# Patient Record
Sex: FEMALE | Race: BLACK OR AFRICAN AMERICAN | NOT HISPANIC OR LATINO
[De-identification: names, ages, dates, MRNs, and addresses within clinical notes are randomized per-mention and may not be internally consistent; named-entity substitution may affect disease eponyms.]

---

## 2017-07-24 ENCOUNTER — TRANSCRIPTION ENCOUNTER (OUTPATIENT)
Age: 22
End: 2017-07-24

## 2017-08-10 ENCOUNTER — TRANSCRIPTION ENCOUNTER (OUTPATIENT)
Age: 22
End: 2017-08-10

## 2018-02-05 ENCOUNTER — TRANSCRIPTION ENCOUNTER (OUTPATIENT)
Age: 23
End: 2018-02-05

## 2018-05-03 ENCOUNTER — RX RENEWAL (OUTPATIENT)
Age: 23
End: 2018-05-03

## 2018-12-07 ENCOUNTER — TRANSCRIPTION ENCOUNTER (OUTPATIENT)
Age: 23
End: 2018-12-07

## 2019-01-25 ENCOUNTER — TRANSCRIPTION ENCOUNTER (OUTPATIENT)
Age: 24
End: 2019-01-25

## 2019-07-13 ENCOUNTER — INPATIENT (INPATIENT)
Facility: HOSPITAL | Age: 24
LOS: 5 days | Discharge: HOME | End: 2019-07-19
Attending: INTERNAL MEDICINE | Admitting: INTERNAL MEDICINE
Payer: MEDICAID

## 2019-07-13 ENCOUNTER — TRANSCRIPTION ENCOUNTER (OUTPATIENT)
Age: 24
End: 2019-07-13

## 2019-07-13 VITALS
DIASTOLIC BLOOD PRESSURE: 68 MMHG | HEART RATE: 92 BPM | RESPIRATION RATE: 18 BRPM | OXYGEN SATURATION: 100 % | TEMPERATURE: 98 F | SYSTOLIC BLOOD PRESSURE: 121 MMHG

## 2019-07-13 LAB
ACANTHOCYTES BLD QL SMEAR: SLIGHT — SIGNIFICANT CHANGE UP
ALBUMIN SERPL ELPH-MCNC: 4.7 G/DL — SIGNIFICANT CHANGE UP (ref 3.5–5.2)
ALP SERPL-CCNC: 56 U/L — SIGNIFICANT CHANGE UP (ref 30–115)
ALT FLD-CCNC: 9 U/L — SIGNIFICANT CHANGE UP (ref 0–41)
ANION GAP SERPL CALC-SCNC: 16 MMOL/L — HIGH (ref 7–14)
AST SERPL-CCNC: 25 U/L — SIGNIFICANT CHANGE UP (ref 0–41)
BASOPHILS # BLD AUTO: 0 K/UL — SIGNIFICANT CHANGE UP (ref 0–0.2)
BASOPHILS NFR BLD AUTO: 0 % — SIGNIFICANT CHANGE UP (ref 0–1)
BILIRUB SERPL-MCNC: 0.3 MG/DL — SIGNIFICANT CHANGE UP (ref 0.2–1.2)
BUN SERPL-MCNC: 8 MG/DL — LOW (ref 10–20)
CALCIUM SERPL-MCNC: 9.4 MG/DL — SIGNIFICANT CHANGE UP (ref 8.5–10.1)
CHLORIDE SERPL-SCNC: 101 MMOL/L — SIGNIFICANT CHANGE UP (ref 98–110)
CO2 SERPL-SCNC: 22 MMOL/L — SIGNIFICANT CHANGE UP (ref 17–32)
CREAT SERPL-MCNC: 0.6 MG/DL — LOW (ref 0.7–1.5)
EOSINOPHIL # BLD AUTO: 0.11 K/UL — SIGNIFICANT CHANGE UP (ref 0–0.7)
EOSINOPHIL NFR BLD AUTO: 3.7 % — SIGNIFICANT CHANGE UP (ref 0–8)
GIANT PLATELETS BLD QL SMEAR: PRESENT — SIGNIFICANT CHANGE UP
GLUCOSE SERPL-MCNC: 91 MG/DL — SIGNIFICANT CHANGE UP (ref 70–99)
HCG SERPL QL: NEGATIVE — SIGNIFICANT CHANGE UP
HCT VFR BLD CALC: 30.7 % — LOW (ref 37–47)
HGB BLD-MCNC: 9.9 G/DL — LOW (ref 12–16)
LYMPHOCYTES # BLD AUTO: 0.5 K/UL — LOW (ref 1.2–3.4)
LYMPHOCYTES # BLD AUTO: 16.5 % — LOW (ref 20.5–51.1)
MANUAL SMEAR VERIFICATION: SIGNIFICANT CHANGE UP
MCHC RBC-ENTMCNC: 27.3 PG — SIGNIFICANT CHANGE UP (ref 27–31)
MCHC RBC-ENTMCNC: 32.2 G/DL — SIGNIFICANT CHANGE UP (ref 32–37)
MCV RBC AUTO: 84.8 FL — SIGNIFICANT CHANGE UP (ref 81–99)
MONOCYTES # BLD AUTO: 0.11 K/UL — SIGNIFICANT CHANGE UP (ref 0.1–0.6)
MONOCYTES NFR BLD AUTO: 3.7 % — SIGNIFICANT CHANGE UP (ref 1.7–9.3)
MYELOCYTES NFR BLD: 0.9 % — HIGH (ref 0–0)
NEUTROPHILS # BLD AUTO: 2.18 K/UL — SIGNIFICANT CHANGE UP (ref 1.4–6.5)
NEUTROPHILS NFR BLD AUTO: 69.7 % — SIGNIFICANT CHANGE UP (ref 42.2–75.2)
NEUTS BAND # BLD: 2.7 % — SIGNIFICANT CHANGE UP (ref 0–6)
OVALOCYTES BLD QL SMEAR: SLIGHT — SIGNIFICANT CHANGE UP
PLAT MORPH BLD: NORMAL — SIGNIFICANT CHANGE UP
PLATELET # BLD AUTO: 94 K/UL — LOW (ref 130–400)
POIKILOCYTOSIS BLD QL AUTO: SIGNIFICANT CHANGE UP
POTASSIUM SERPL-MCNC: 3.6 MMOL/L — SIGNIFICANT CHANGE UP (ref 3.5–5)
POTASSIUM SERPL-SCNC: 3.6 MMOL/L — SIGNIFICANT CHANGE UP (ref 3.5–5)
PROT SERPL-MCNC: 9 G/DL — HIGH (ref 6–8)
RBC # BLD: 3.62 M/UL — LOW (ref 4.2–5.4)
RBC # FLD: 12.6 % — SIGNIFICANT CHANGE UP (ref 11.5–14.5)
RBC BLD AUTO: ABNORMAL
SMUDGE CELLS # BLD: PRESENT — SIGNIFICANT CHANGE UP
SODIUM SERPL-SCNC: 139 MMOL/L — SIGNIFICANT CHANGE UP (ref 135–146)
TOXIC GRANULES BLD QL SMEAR: PRESENT — SIGNIFICANT CHANGE UP
VARIANT LYMPHS # BLD: 2.8 % — SIGNIFICANT CHANGE UP (ref 0–5)
WBC # BLD: 3.01 K/UL — LOW (ref 4.8–10.8)
WBC # FLD AUTO: 3.01 K/UL — LOW (ref 4.8–10.8)

## 2019-07-13 PROCEDURE — 99285 EMERGENCY DEPT VISIT HI MDM: CPT

## 2019-07-13 PROCEDURE — 70450 CT HEAD/BRAIN W/O DYE: CPT | Mod: 26

## 2019-07-13 NOTE — ED PROVIDER NOTE - PROGRESS NOTE DETAILS
Patient seen and evaluated by me. Labs/imaging ordered. Will re-eval pending work up. Labs/imaging back and results discussed with patient. Spoke with neuro who will come see patient. Called neuro again - has not yet seen patient. She is coming down soon. Case endorsed to Dr. Jeffries pending neuro recs, re-eval, dispo. Spoke with Dr. Shea Hurtado.  Asks for Toxo IGG, Cryptococcal Serum Antigen and CD4 viral load to be added on to AM labs.  Endorsed to MAR.

## 2019-07-13 NOTE — ED PROVIDER NOTE - OBJECTIVE STATEMENT
23 year old female, pmhx HIV (unknown last CD4 count but states she thinks it was in the 30s? - last tested March 2019 per patient, but most recent record in [facility] shows 215 in March 2016), bipolar disorder, presenting with a 2 day history of right facial weakness and RUE and RLE weakness. 23 year old female, pmhx HIV (unknown last CD4 count but states she thinks it was in the 30s? - last tested March 2019 per patient, but most recent record in [facility] shows 215 in March 2016), bipolar disorder, presenting with a 2 day history of right facial weakness and RUE and RLE weakness. States she thought at first she had slept in a weird position causing the symptoms but the symptoms didn't go away so she came to the ED. Denies having this before, and denies other symptoms including fevers, headache, vision changes, numbness, confusion, URI symptoms, neck pain, chest pain, back pain, dyspnea, cough, palpitations, nausea, vomiting, abdominal pain, diarrhea, constipation, blood in stool/dark stools, urinary symptoms, vaginal bleeding/discharge, leg swelling, rash, recent travel or sick contacts.

## 2019-07-13 NOTE — CONSULT NOTE ADULT - ASSESSMENT
24 yo right handed female with pmhx HIV (unknown last CD4 count but states she thinks it was in the 30s? - last tested March 2019 per patient, but most recent record in [facility] shows 215 in March 2016), bipolar disorder, presenting with a 2 day history of right  RUE and RLE weakness. NIHSS 3. CTH negative for acute findings.                                  R/o CVA      Plan  Admit to stroke unit  N/c mri brain  MRA H/N  start asa 81 mg q 24  start Lipitor 40mg q 24  Echo with bubble  Lipid profile , hbaic  pt /rehab  Speech and swallow  Neuro attending note will follow

## 2019-07-13 NOTE — ED PROVIDER NOTE - PHYSICAL EXAMINATION
Vital Signs: I have reviewed the initial vital signs.  Constitutional: NAD, well-nourished, appears stated age, no acute distress.  HEENT: Airway patent, moist MM, no erythema/swelling/deformity of oral structures. EOMI, PERRLA.  CV: regular rate, regular rhythm, well-perfused extremities, 2+ b/l DP and radial pulses equal.  Lungs: BCTA, no increased WOB.  ABD: NTND, no guarding or rebound, no pulsatile mass, no hernias.   MSK: Neck supple, nontender, nl ROM, no stepoff. Chest nontender. Back nontender in TLS spine or to b/l bony structures or flanks. Ext nontender, nl rom, no deformity.   INTEG: Skin warm, dry, no rash.  NEURO: A&Ox3, 4/5 strength in RUE and RLE, 5/5 strength in LUE and LLE. (+) slight facial droop on the right. nl sensation throughout, normal speech.   PSYCH: Calm, cooperative, normal affect and interaction.

## 2019-07-13 NOTE — CONSULT NOTE ADULT - SUBJECTIVE AND OBJECTIVE BOX
CC: Right sided weakness        HPI:   24 yo right handed female with pmhx HIV (unknown last CD4 count but states she thinks it was in the 30s? - last tested March 2019 per patient, but most recent record in [facility] shows 215 in March 2016), bipolar disorder, presenting with a 2 day history of right  RUE and RLE weakness. Patient says that she first noted the weakness in her right arm due to which she had difficulty brushing her teeth and she initially thought that she slept the wrong way causing the symptoms but the next day she experienced heaviness of her right leg due to which she had difficulty walking. Since the symptoms persisted she came to ED for further eval.  Denies similar symptoms before and denies other symptoms including fevers, headache, vision changes, speech deficits numbness, paresthesias, confusion, neck pain.     Home Medications:  Prescobix  Tivicay  Dapsone  Descovy  Lamotrigine 25 mg q 24    Social history  Denies smoking alcohol or drug use    Family history  Mother with HIV and CVA X 2 at age 51 which left her paralysed  Father with Afib and HTN    Other pertinent hx  Patient has  Nexplanon implant  She denies h/o blood clots    Neuro Exam:  Orientation: oriented to person, place time and situation, speech and language intact  Cranial Nerves: visual acuity intact bilaterally, visual fields full to confrontation, pupils equal round and reactive to light, extraocular motion intact, facial sensation intact symmetrically, face symmetrical, hearing was intact bilaterally, tongue and palate midline, head turning and shoulder shrug symmetric and there was no tongue deviation with protrusion.   Motor: RUE 4+/5   RLE 4-/5             LUE  5/5     LLE 5/5   Sensory exam:  Intact  Coordination:. dysmetria on the right , limb ataxia on the right  DTR: Brisk reflexes throughout  gait: deferred    NIHSS:   Loc  0   commands  0    questions   0  vf 0  gaze  0  Face 0  RUE 1  RLE  1  LUE 0  LLE0  Sensory  0  Allergies    No Known Allergies    Intolerances      MEDICATIONS  (STANDING):    MEDICATIONS  (PRN):      LABS:                        9.9    3.01  )-----------( 94       ( 13 Jul 2019 19:00 )             30.7     07-13    139  |  101  |  8<L>  ----------------------------<  91  3.6   |  22  |  0.6<L>    Ca    9.4      13 Jul 2019 19:00    TPro  9.0<H>  /  Alb  4.7  /  TBili  0.3  /  DBili  x   /  AST  25  /  ALT  9   /  AlkPhos  56  07-13            Neuro Imaging:    EEG:     Echo:   Carotid Doppler: N/A  Telemetry: CC: Right sided weakness        HPI:   22 yo right handed female with pmhx HIV (unknown last CD4 count but states she thinks it was in the 30s? - last tested March 2019 per patient, but most recent record in [facility] shows 215 in March 2016), bipolar disorder, presenting with a 2 day history of right  RUE and RLE weakness. Patient says that she first noted the weakness in her right arm due to which she had difficulty brushing her teeth and she initially thought that she slept the wrong way causing the symptoms but the next day she experienced heaviness of her right leg due to which she had difficulty walking. Since the symptoms persisted she came to ED for further eval.  Denies similar symptoms before and denies other symptoms including fevers, headache, vision changes, speech deficits numbness, paresthesias, confusion, neck pain.     Home Medications:  Prescobix  Tivicay  Dapsone  Descovy  Lamotrigine 25 mg q 24    Social history  Denies smoking alcohol or drug use    Family history  Mother with HIV and CVA X 2 at age 51 which left her paralysed  Father with Afib and HTN    Other pertinent hx  Patient has  Nexplanon implant  She denies h/o blood clots    Neuro Exam:  Orientation: oriented to person, place time and situation, speech and language intact  Cranial Nerves: visual acuity intact bilaterally, visual fields full to confrontation, pupils equal round and reactive to light, extraocular motion intact, facial sensation intact symmetrically, face symmetrical, hearing was intact bilaterally, tongue and palate midline, head turning and shoulder shrug symmetric and there was no tongue deviation with protrusion.   Motor: RUE 4+/5   RLE 4-/5             LUE  5/5     LLE 5/5   Sensory exam:  Intact  Coordination:. dysmetria on the right , limb ataxia on the right  DTR: Brisk reflexes throughout  gait: deferred    NIHSS: 3   Loc  0   commands  0    questions   0  vf 0  gaze  0  Face 0  ataxia 1  RUE 1  RLE  1  LUE 0  LLE0  Sensory  0  speech 0  language 0  extinction 0    mRS Baseline 0  mRs current 2      Allergies    No Known Allergies    Intolerances      MEDICATIONS  (STANDING):    MEDICATIONS  (PRN):      LABS:                        9.9    3.01  )-----------( 94       ( 13 Jul 2019 19:00 )             30.7     07-13    139  |  101  |  8<L>  ----------------------------<  91  3.6   |  22  |  0.6<L>    Ca    9.4      13 Jul 2019 19:00    TPro  9.0<H>  /  Alb  4.7  /  TBili  0.3  /  DBili  x   /  AST  25  /  ALT  9   /  AlkPhos  56  07-13            Neuro Imaging:  < from: CT Head No Cont (07.13.19 @ 20:01) >  Findings:    The ventricles, basal cisterns and sulcal pattern are within normal   limits for the patient's stated age.      Grey-white differentiation is preserved.    There is no acute mass effect, midline shift or intracranial hemorrhage.      The calvarium is intact.    The visualized paranasal sinuses and bilateral mastoid complexes are   unremarkable. The globes and orbits are unremarkable.    Beam hardening artifact is noted overlying the brain stem and posterior  fossa which is inherent to CT in this location.    Impression:     No CT evidence of acute intracranial pathology.      < end of copied text >    EEG:     Echo:   Carotid Doppler: N/A  Telemetry:

## 2019-07-13 NOTE — H&P ADULT - NSHPREVIEWOFSYSTEMS_GEN_ALL_CORE
CONSTITUTIONAL: No fevers or chills  EYES/ENT: No visual changes;  No vertigo or throat pain   NECK: No pain or stiffness  RESPIRATORY: No cough, wheezing, hemoptysis; No shortness of breath  CARDIOVASCULAR: No chest pain or palpitations  GASTROINTESTINAL: No abdominal or epigastric pain. No nausea, vomiting, or hematemesis; No diarrhea or constipation. No melena or hematochezia.  GENITOURINARY: No dysuria, frequency or hematuria  NEUROLOGICAL: see HPI  SKIN: No itching, rashes

## 2019-07-13 NOTE — H&P ADULT - NSICDXPASTMEDICALHX_GEN_ALL_CORE_FT
PAST MEDICAL HISTORY:  Anxiety     Bipolar disorder     HIV (human immunodeficiency virus infection)

## 2019-07-13 NOTE — H&P ADULT - HISTORY OF PRESENT ILLNESS
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 she states that her right upper ext doesn't feel right, she can hold things without falling but she feels weak, even during brushing her teeth she cannot do it as usual, she feels like the weakness more exacerbated at the shoulder joint. her right leg is also weak, she has a limp to the right, mainly at the shoulder but no fall. she denies slurred speech, H/A, blurry vision, diplopia, fever, chills, sick contacts, recent travel, pets at home or contacts with animals.  she is following now with dr Garcia and last CD4 count was in march, as per the patient it was low 37, and dr Garcia did not get back to her what to do.    in the ED IJ=652/68   P=92  P=100  RR=18  T=98.5  labs showed pancytopenia, CTH prelim is negative

## 2019-07-13 NOTE — H&P ADULT - NSHPPHYSICALEXAM_GEN_ALL_CORE
Vital Signs Last 24 Hrs  T(C): 36.9 (13 Jul 2019 16:23), Max: 36.9 (13 Jul 2019 16:23)  T(F): 98.5 (13 Jul 2019 16:23), Max: 98.5 (13 Jul 2019 16:23)  HR: 92 (13 Jul 2019 16:23) (92 - 92)  BP: 121/68 (13 Jul 2019 16:23) (121/68 - 121/68)  RR: 18 (13 Jul 2019 16:23) (18 - 18)  SpO2: 100% (13 Jul 2019 16:23) (100% - 100%)    GENERAL: NAD, speaks in full sentences, no signs of respiratory distress  HEAD:  Atraumatic, Normocephalic  EYES: EOMI, PERRLA   NECK: Supple, No JVD  CHEST/LUNG: Clear to auscultation bilaterally; No wheeze; No crackles; No accessory muscles used  HEART: Regular rate and rhythm; No murmurs;   ABDOMEN: Soft, Nontender, Nondistended; Bowel sounds present; No guarding  EXTREMITIES:  2+ Peripheral Pulses, No cyanosis or edema  PSYCH: AAOx3  NEUROLOGY: motor power 5/5 all over but 3-4/5 in right shoulder and 4/5 right hip, ataxic gait with limping to right, right drift  SKIN: No rashes or lesions

## 2019-07-13 NOTE — ED PROVIDER NOTE - CARE PLAN
Principal Discharge DX:	Weakness of right upper extremity  Secondary Diagnosis:	Weakness on right side of face  Secondary Diagnosis:	Weakness of right lower extremity  Secondary Diagnosis:	HIV (human immunodeficiency virus infection)

## 2019-07-13 NOTE — ED PROVIDER NOTE - SECONDARY DIAGNOSIS.
Weakness on right side of face Weakness of right lower extremity HIV (human immunodeficiency virus infection)

## 2019-07-13 NOTE — ED ADULT NURSE NOTE - CHPI ED NUR SYMPTOMS NEG
no change in level of consciousness/no dizziness/no numbness/no confusion/no loss of consciousness/no blurred vision/no vomiting/no nausea/no fever

## 2019-07-13 NOTE — H&P ADULT - NSHPLABSRESULTS_GEN_ALL_CORE
Labs:                        9.9    3.01  )-----------( 94       ( 13 Jul 2019 19:00 )             30.7             07-13    139  |  101  |  8<L>  ----------------------------<  91  3.6   |  22  |  0.6<L>    Ca    9.4      13 Jul 2019 19:00    TPro  9.0<H>  /  Alb  4.7  /  TBili  0.3  /  DBili  x   /  AST  25  /  ALT  9   /  AlkPhos  56  07-13    LIVER FUNCTIONS - ( 13 Jul 2019 19:00 )  Alb: 4.7 g/dL / Pro: 9.0 g/dL / ALK PHOS: 56 U/L / ALT: 9 U/L / AST: 25 U/L / GGT: x                     Imaging:  < from: CT Head No Cont (07.13.19 @ 20:01) >  Impression:   No CT evidence of acute intracranial pathology.  ******PRELIMINARY REPORT******    ******PRELIMINARY REPORT******        < end of copied text >

## 2019-07-13 NOTE — CONSULT NOTE ADULT - ATTENDING COMMENTS
patient seen and examined  agree with NP note except as noted  NIHSS 3  MRS0  PATIENT WITH PERSISTENT R HP AS ABOVE  PLAN AS ABOVE  IF MRI BRAIN AND MRA H/N UNREMARKABLE, THEN CONSIDER MRI C SPINE (NO FACIAL INVOLVEMENT)  PLEASE CHECK HYPERCOAG PANEL AS WELL  CONSIDER ID eval given hx of HIV and reported low CD4

## 2019-07-13 NOTE — ED ADULT NURSE NOTE - OBJECTIVE STATEMENT
Patient presents with c/o right sided weakness for about 2 days. States her right leg and arm feel heavy and not as strong. Denies any LOC. No chest pain or shortness of breath.

## 2019-07-13 NOTE — H&P ADULT - ASSESSMENT
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     *New onset weakness in HIV patient  -CTH negative  -leukopenia  -r/o lymphoma, HIV encephalopathy, toxoplasma, cryptococcus  -MR brain with and without contrast, if MRI is negative consider LP  -Toxo serology, cryptococcal ag  -HIV viral load with T cell subset  -ID follow up  -Neurology evaluation    *Pancytopenia   -can be HIV related, medications related (lamotrigine)  -will check folate and b12, iron studies    *HIV  -check count  -c/w home meds, follow up ID  -she is on dapsone for ppx   -according to CD4 count start ppx as needed    *Anxiety, Bipolar  -c/w lamotrigine and hydroxyzine     *DVT ppx: SCD until MRI is back  *regular diet, allergy to mushroom  *Full code

## 2019-07-13 NOTE — H&P ADULT - NSICDXFAMILYHX_GEN_ALL_CORE_FT
FAMILY HISTORY:  Family history of HIV, mother  FH: atrial fibrillation, father  FH: HTN (hypertension), father

## 2019-07-14 LAB
IRON SATN MFR SERPL: 31 % — SIGNIFICANT CHANGE UP (ref 15–50)
IRON SATN MFR SERPL: 94 UG/DL — SIGNIFICANT CHANGE UP (ref 35–150)
RBC # BLD: 3.43 M/UL — LOW (ref 4.2–5.4)
RETICS #: 23.3 K/UL — LOW (ref 25–125)
RETICS/RBC NFR: 0.7 % — SIGNIFICANT CHANGE UP (ref 0.5–1.5)
TIBC SERPL-MCNC: 306 UG/DL — SIGNIFICANT CHANGE UP (ref 220–430)
UIBC SERPL-MCNC: 212 UG/DL — SIGNIFICANT CHANGE UP (ref 110–370)

## 2019-07-14 PROCEDURE — 70544 MR ANGIOGRAPHY HEAD W/O DYE: CPT | Mod: 26,59

## 2019-07-14 PROCEDURE — 70553 MRI BRAIN STEM W/O & W/DYE: CPT | Mod: 26

## 2019-07-14 PROCEDURE — 99222 1ST HOSP IP/OBS MODERATE 55: CPT

## 2019-07-14 RX ORDER — DARUNAVIR ETHANOLATE AND COBICISTAT 800; 150 MG/1; MG/1
1 TABLET, FILM COATED ORAL DAILY
Refills: 0 | Status: DISCONTINUED | OUTPATIENT
Start: 2019-07-14 | End: 2019-07-18

## 2019-07-14 RX ORDER — HYDROXYZINE HCL 10 MG
50 TABLET ORAL EVERY 12 HOURS
Refills: 0 | Status: DISCONTINUED | OUTPATIENT
Start: 2019-07-14 | End: 2019-07-19

## 2019-07-14 RX ORDER — CHLORHEXIDINE GLUCONATE 213 G/1000ML
1 SOLUTION TOPICAL
Refills: 0 | Status: DISCONTINUED | OUTPATIENT
Start: 2019-07-14 | End: 2019-07-19

## 2019-07-14 RX ORDER — DAPSONE 100 MG/1
100 TABLET ORAL DAILY
Refills: 0 | Status: DISCONTINUED | OUTPATIENT
Start: 2019-07-14 | End: 2019-07-19

## 2019-07-14 RX ORDER — EMTRICITABINE AND TENOFOVIR DISOPROXIL FUMARATE 200; 300 MG/1; MG/1
1 TABLET, FILM COATED ORAL DAILY
Refills: 0 | Status: DISCONTINUED | OUTPATIENT
Start: 2019-07-14 | End: 2019-07-18

## 2019-07-14 RX ORDER — DOLUTEGRAVIR SODIUM 25 MG/1
50 TABLET, FILM COATED ORAL DAILY
Refills: 0 | Status: DISCONTINUED | OUTPATIENT
Start: 2019-07-14 | End: 2019-07-18

## 2019-07-14 RX ORDER — LAMOTRIGINE 25 MG/1
25 TABLET, ORALLY DISINTEGRATING ORAL AT BEDTIME
Refills: 0 | Status: DISCONTINUED | OUTPATIENT
Start: 2019-07-14 | End: 2019-07-19

## 2019-07-14 RX ADMIN — Medication 50 MILLIGRAM(S): at 17:35

## 2019-07-14 RX ADMIN — LAMOTRIGINE 25 MILLIGRAM(S): 25 TABLET, ORALLY DISINTEGRATING ORAL at 22:21

## 2019-07-14 RX ADMIN — Medication 1 DROP(S): at 20:36

## 2019-07-14 RX ADMIN — Medication 50 MILLIGRAM(S): at 06:43

## 2019-07-14 RX ADMIN — DARUNAVIR ETHANOLATE AND COBICISTAT 1 TABLET(S): 800; 150 TABLET, FILM COATED ORAL at 15:05

## 2019-07-14 RX ADMIN — DOLUTEGRAVIR SODIUM 50 MILLIGRAM(S): 25 TABLET, FILM COATED ORAL at 15:04

## 2019-07-14 RX ADMIN — CHLORHEXIDINE GLUCONATE 1 APPLICATION(S): 213 SOLUTION TOPICAL at 05:25

## 2019-07-14 RX ADMIN — DAPSONE 100 MILLIGRAM(S): 100 TABLET ORAL at 15:04

## 2019-07-14 RX ADMIN — EMTRICITABINE AND TENOFOVIR DISOPROXIL FUMARATE 1 TABLET(S): 200; 300 TABLET, FILM COATED ORAL at 15:06

## 2019-07-14 NOTE — PROGRESS NOTE ADULT - ASSESSMENT
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     *New onset weakness in HIV patient  -CTH negative for bleed or mass  -r/o lymphoma, HIV encephalopathy, toxoplasma, cryptococcus  - f/u MRI brain  -Toxo serology, cryptococcal ag f/u, specimen received  -HIV viral load with T cell subset ordered  -ID follow up  -Neurology f/u    *Pancytopenia   -can be HIV related, medications related (lamotrigine)  - ordered cd4 and viral load    *HIV  -c/w home meds, follow up ID  -she is on dapsone for ppx     *Anxiety, Bipolar  -c/w lamotrigine and hydroxyzine     *DVT ppx: SCD until MRI  *regular diet,  *Full code

## 2019-07-14 NOTE — PROGRESS NOTE ADULT - SUBJECTIVE AND OBJECTIVE BOX
SUBJECTIVE:    Patient is a 23y old Female who presents with a chief complaint of Righty sided weakness (14 Jul 2019 09:39)    Currently admitted to medicine with the primary diagnosis of Weakness of right upper extremity     Today is hospital day 1d. This morning she is resting comfortably in bed and reports no new issues or overnight events.     PAST MEDICAL & SURGICAL HISTORY  Anxiety  Bipolar disorder  HIV (human immunodeficiency virus infection)  No significant past surgical history       ALLERGIES:  Mushrooms (Pruritus)  No Known Drug Allergies    MEDICATIONS:  STANDING MEDICATIONS  artificial tears (preservative free) Ophthalmic Solution 1 Drop(s) Both EYES two times a day  chlorhexidine 4% Liquid 1 Application(s) Topical <User Schedule>  dapsone 100 milliGRAM(s) Oral daily  darunavir 800 mG/cobicstat 150 mG 1 Tablet(s) Oral daily  dolutegravir 50 milliGRAM(s) Oral daily  emtricitabine 200 mG/tenofovir alafenamide 25 mG (DESCOVY) Tablet 1 Tablet(s) Oral daily  hydrOXYzine hydrochloride 50 milliGRAM(s) Oral every 12 hours  lamoTRIgine 25 milliGRAM(s) Oral at bedtime    PRN MEDICATIONS    VITALS:   T(F): 99.1  HR: 82  BP: 100/59  RR: 18  SpO2: 100%    LABS:                        9.9    3.01  )-----------( 94       ( 13 Jul 2019 19:00 )             30.7     07-13    139  |  101  |  8<L>  ----------------------------<  91  3.6   |  22  |  0.6<L>    Ca    9.4      13 Jul 2019 19:00    TPro  9.0<H>  /  Alb  4.7  /  TBili  0.3  /  DBili  x   /  AST  25  /  ALT  9   /  AlkPhos  56  07-13      PHYSICAL EXAM:  GEN: No acute distress  LUNGS: Clear to auscultation bilaterally   HEART: Regular  ABD: Soft, non-tender, non-distended.  EXT: mild weakness on rt side of body  NEURO: AAOX3

## 2019-07-14 NOTE — PROGRESS NOTE ADULT - ASSESSMENT
ASSESSMENT  22 yo F with vertical transmission AIDS, Bipolar disorder, anxiety, recent miscarriage, presenting with 2 days of sudden R-sided weakness    IMPRESSION/RECOMMENDATIONS  #Acute R-sided weakness: CTH negative for acute pathology. As patient reports last CD4 is <50 the differential is wide including PML, toxo, VZV vasculopathy, vs. non-infectious etiology  - Appreciate Neuro consult  - Brain MRI  - If no lesions on MRI and not limited by PLTs would obtain LP: cell count, protein, glucose, G/S and culture, CSF PCR, Cryptococcal Ag , VDRL, JOCELYNN Virus  - RPR, Cryptococcal Ag serum, Toxo IgG and IgM, CMV IgG    #Pancytopenia: Unknown baseline, possible AIDS-related ITP  - Obtain baseline from PCP  - Peripheral smear  - check iron studies, ferritin  - check retic  - Alk Ph is wnl, thus low suspicion for MAC or bone marrow occupying infection--> send Acid Fast blood culture   - No febrile illness to suggest EBV or CMV  - Adverse rxn with hemolytic anemia and dapsone if G6PD, should not affect platelets    #Symptomatic AIDS  - Check CD4/VL  - Follows with Dr. Garcia  - On Descovy, prezcobix, DTG (discussed changing to another Integrase inhibitor as she is of child bearing age and increased risk of neural tube defects). Can change regimen to Biktarvy/Prezcobix. Will discuss with Dr. Garcia  - PPX Dapsone     #Bipolar  - Lamictal 25mg PO    Spectra [id number]

## 2019-07-14 NOTE — PROGRESS NOTE ADULT - SUBJECTIVE AND OBJECTIVE BOX
JESSICA ALVAREZ  23y, Female  Allergy: Mushrooms (Pruritus)  No Known Drug Allergies      CHIEF COMPLAINT: Righty sided weakness (13 Jul 2019 23:45)      HPI:  24 yo F with vertical transmission AIDS, on prezcobix, DTG, Descovy with dapsone ppx, Bipolar disorder, anxiety, recent miscarriage, presenting with 2 days of sudden R-sided weakness that she noted after waking up. Denies any HA, paresthesias, numbness, fever, chills. Pt denies rhinorrhea, sore throat, cough, SOB, abd pain, diarrhea, n/v, dysuria, rash, arthralgias. No recent travel or exposures. No drug use.     INFECTIOUS DISEASE HISTORY:    PAST MEDICAL & SURGICAL HISTORY:  Anxiety  Bipolar disorder  HIV (human immunodeficiency virus infection)  No significant past surgical history      FAMILY HISTORY  FH: atrial fibrillation  FH: HTN (hypertension)  Family history of HIV      SOCIAL HISTORY    Substance Use ( x ) never used  (  ) IVDU (  ) Other:  Tobacco Usage:  ( x  ) never smoked   (   ) former smoker   (   ) current smoker   Alcohol Usage: (  x ) social  (   ) daily use (   ) denies  Sexual History: sexually active with males      ROS  General: Denies rigors, nightsweats  HEENT: Denies headache, rhinorrhea, sore throat, eye pain  CV: Denies CP, palpitations  PULM: Denies SOB, wheezing  GI: Denies abdominal pain, hematochezia/melena  : Denies dysuria, hematuria  MSK: Denies arthralgias, myalgias  SKIN: Denies rash, lesions  NEURO: as noted above   PSYCH: Denies depression, anxiety    VITALS:  T(F): 99.1, Max: 99.1 (07-14-19 @ 00:29)  HR: 82  BP: 100/59  RR: 18Vital Signs Last 24 Hrs  T(C): 37.3 (14 Jul 2019 00:29), Max: 37.3 (14 Jul 2019 00:29)  T(F): 99.1 (14 Jul 2019 00:29), Max: 99.1 (14 Jul 2019 00:29)  HR: 82 (14 Jul 2019 00:29) (82 - 92)  BP: 100/59 (14 Jul 2019 00:29) (100/59 - 121/68)  BP(mean): --  RR: 18 (14 Jul 2019 00:29) (18 - 18)  SpO2: 100% (14 Jul 2019 00:29) (100% - 100%)    PHYSICAL EXAM:  Gen: NAD, resting in bed  HEENT: Normocephalic, atraumatic, pupils dilated and reactive, equal  Neck: supple, no lymphadenopathy  CV: Regular rate & regular rhythm  Lungs: CTAB  Abdomen: Soft, BS present  Ext: Warm, well perfused  Neuro: RUE and RLE 4/5, hyperreflexia, dysmetria   Skin: no rash, no erythema  Lines: no phlebitis    TESTS & MEASUREMENTS:                        9.9    3.01  )-----------( 94       ( 13 Jul 2019 19:00 )             30.7     07-13    139  |  101  |  8<L>  ----------------------------<  91  3.6   |  22  |  0.6<L>    Ca    9.4      13 Jul 2019 19:00    TPro  9.0<H>  /  Alb  4.7  /  TBili  0.3  /  DBili  x   /  AST  25  /  ALT  9   /  AlkPhos  56  07-13    eGFR if Non African American: 130 mL/min/1.73M2 (07-13-19 @ 19:00)  eGFR if : 151 mL/min/1.73M2 (07-13-19 @ 19:00)    LIVER FUNCTIONS - ( 13 Jul 2019 19:00 )  Alb: 4.7 g/dL / Pro: 9.0 g/dL / ALK PHOS: 56 U/L / ALT: 9 U/L / AST: 25 U/L / GGT: x                     INFECTIOUS DISEASES TESTING      RADIOLOGY & ADDITIONAL TESTS:  I have personally reviewed the last Chest xray  CXR      CT      CARDIOLOGY TESTING      MEDICATIONS  chlorhexidine 4% Liquid 1  dapsone 100  darunavir 800 mG/cobicstat 150 mG 1  dolutegravir 50  emtricitabine 200 mG/tenofovir alafenamide 25 mG (DESCOVY) Tablet 1  hydrOXYzine hydrochloride 50  lamoTRIgine 25      ANTIBIOTICS:  dapsone 100 milliGRAM(s) Oral daily  darunavir 800 mG/cobicstat 150 mG 1 Tablet(s) Oral daily  dolutegravir 50 milliGRAM(s) Oral daily  emtricitabine 200 mG/tenofovir alafenamide 25 mG (DESCOVY) Tablet 1 Tablet(s) Oral daily      Mushrooms (Pruritus)  No Known Drug Allergies

## 2019-07-15 LAB
4/8 RATIO: 0.1 RATIO — LOW (ref 1.2–3.4)
ABS CD8: 251 /UL — SIGNIFICANT CHANGE UP (ref 206–494)
ALBUMIN SERPL ELPH-MCNC: 4 G/DL — SIGNIFICANT CHANGE UP (ref 3.5–5.2)
ALP SERPL-CCNC: 51 U/L — SIGNIFICANT CHANGE UP (ref 30–115)
ALT FLD-CCNC: 8 U/L — SIGNIFICANT CHANGE UP (ref 0–41)
ANION GAP SERPL CALC-SCNC: 13 MMOL/L — SIGNIFICANT CHANGE UP (ref 7–14)
AST SERPL-CCNC: 23 U/L — SIGNIFICANT CHANGE UP (ref 0–41)
BASOPHILS # BLD AUTO: 0 K/UL — SIGNIFICANT CHANGE UP (ref 0–0.2)
BASOPHILS NFR BLD AUTO: 0 % — SIGNIFICANT CHANGE UP (ref 0–1)
BILIRUB SERPL-MCNC: 0.2 MG/DL — SIGNIFICANT CHANGE UP (ref 0.2–1.2)
BUN SERPL-MCNC: 9 MG/DL — LOW (ref 10–20)
CALCIUM SERPL-MCNC: 9.3 MG/DL — SIGNIFICANT CHANGE UP (ref 8.5–10.1)
CD16+CD56+ CELLS NFR BLD: 20 % — SIGNIFICANT CHANGE UP (ref 4–20)
CD16+CD56+ CELLS NFR SPEC: 86 /UL — LOW (ref 89–385)
CD19 BLASTS SPEC-ACNC: 13 % — SIGNIFICANT CHANGE UP (ref 10–28)
CD19 BLASTS SPEC-ACNC: 58 /UL — LOW (ref 72–502)
CD3 BLASTS SPEC-ACNC: 286 /UL — LOW (ref 800–2012)
CD3 BLASTS SPEC-ACNC: 65 % — SIGNIFICANT CHANGE UP (ref 59–81)
CD4 %: 4 % — LOW (ref 42–58)
CD8 %: 58 % — HIGH (ref 14–30)
CHLORIDE SERPL-SCNC: 104 MMOL/L — SIGNIFICANT CHANGE UP (ref 98–110)
CO2 SERPL-SCNC: 23 MMOL/L — SIGNIFICANT CHANGE UP (ref 17–32)
CREAT SERPL-MCNC: 0.8 MG/DL — SIGNIFICANT CHANGE UP (ref 0.7–1.5)
EOSINOPHIL # BLD AUTO: 0.09 K/UL — SIGNIFICANT CHANGE UP (ref 0–0.7)
EOSINOPHIL NFR BLD AUTO: 4.3 % — SIGNIFICANT CHANGE UP (ref 0–8)
FERRITIN SERPL-MCNC: 30 NG/ML — SIGNIFICANT CHANGE UP (ref 15–150)
FOLATE SERPL-MCNC: 11.2 NG/ML — SIGNIFICANT CHANGE UP
GLUCOSE SERPL-MCNC: 97 MG/DL — SIGNIFICANT CHANGE UP (ref 70–99)
HCT VFR BLD CALC: 30.1 % — LOW (ref 37–47)
HGB BLD-MCNC: 9.6 G/DL — LOW (ref 12–16)
IMM GRANULOCYTES NFR BLD AUTO: 0 % — LOW (ref 0.1–0.3)
LYMPHOCYTES # BLD AUTO: 0.51 K/UL — LOW (ref 1.2–3.4)
LYMPHOCYTES # BLD AUTO: 24.5 % — SIGNIFICANT CHANGE UP (ref 20.5–51.1)
MCHC RBC-ENTMCNC: 27.1 PG — SIGNIFICANT CHANGE UP (ref 27–31)
MCHC RBC-ENTMCNC: 31.9 G/DL — LOW (ref 32–37)
MCV RBC AUTO: 85 FL — SIGNIFICANT CHANGE UP (ref 81–99)
MONOCYTES # BLD AUTO: 0.43 K/UL — SIGNIFICANT CHANGE UP (ref 0.1–0.6)
MONOCYTES NFR BLD AUTO: 20.7 % — HIGH (ref 1.7–9.3)
NEUTROPHILS # BLD AUTO: 1.05 K/UL — LOW (ref 1.4–6.5)
NEUTROPHILS NFR BLD AUTO: 50.5 % — SIGNIFICANT CHANGE UP (ref 42.2–75.2)
NRBC # BLD: 0 /100 WBCS — SIGNIFICANT CHANGE UP (ref 0–0)
PLATELET # BLD AUTO: 88 K/UL — LOW (ref 130–400)
POTASSIUM SERPL-MCNC: 4 MMOL/L — SIGNIFICANT CHANGE UP (ref 3.5–5)
POTASSIUM SERPL-SCNC: 4 MMOL/L — SIGNIFICANT CHANGE UP (ref 3.5–5)
PROT SERPL-MCNC: 8.1 G/DL — HIGH (ref 6–8)
RBC # BLD: 3.54 M/UL — LOW (ref 4.2–5.4)
RBC # FLD: 12.4 % — SIGNIFICANT CHANGE UP (ref 11.5–14.5)
SODIUM SERPL-SCNC: 140 MMOL/L — SIGNIFICANT CHANGE UP (ref 135–146)
T-CELL CD4 SUBSET PNL BLD: 16 /UL — LOW (ref 495–1312)
TSH SERPL-MCNC: 2.01 UIU/ML — SIGNIFICANT CHANGE UP (ref 0.27–4.2)
VIT B12 SERPL-MCNC: 420 PG/ML — SIGNIFICANT CHANGE UP (ref 232–1245)
WBC # BLD: 2.08 K/UL — LOW (ref 4.8–10.8)
WBC # FLD AUTO: 2.08 K/UL — LOW (ref 4.8–10.8)

## 2019-07-15 PROCEDURE — 93306 TTE W/DOPPLER COMPLETE: CPT | Mod: 26

## 2019-07-15 PROCEDURE — 99233 SBSQ HOSP IP/OBS HIGH 50: CPT

## 2019-07-15 RX ORDER — ONDANSETRON 8 MG/1
4 TABLET, FILM COATED ORAL ONCE
Refills: 0 | Status: DISCONTINUED | OUTPATIENT
Start: 2019-07-15 | End: 2019-07-15

## 2019-07-15 RX ORDER — ONDANSETRON 8 MG/1
4 TABLET, FILM COATED ORAL ONCE
Refills: 0 | Status: COMPLETED | OUTPATIENT
Start: 2019-07-15 | End: 2019-07-15

## 2019-07-15 RX ORDER — ACETAMINOPHEN 500 MG
650 TABLET ORAL EVERY 6 HOURS
Refills: 0 | Status: DISCONTINUED | OUTPATIENT
Start: 2019-07-15 | End: 2019-07-19

## 2019-07-15 RX ADMIN — DARUNAVIR ETHANOLATE AND COBICISTAT 1 TABLET(S): 800; 150 TABLET, FILM COATED ORAL at 14:21

## 2019-07-15 RX ADMIN — LAMOTRIGINE 25 MILLIGRAM(S): 25 TABLET, ORALLY DISINTEGRATING ORAL at 21:58

## 2019-07-15 RX ADMIN — DOLUTEGRAVIR SODIUM 50 MILLIGRAM(S): 25 TABLET, FILM COATED ORAL at 14:21

## 2019-07-15 RX ADMIN — Medication 650 MILLIGRAM(S): at 21:58

## 2019-07-15 RX ADMIN — EMTRICITABINE AND TENOFOVIR DISOPROXIL FUMARATE 1 TABLET(S): 200; 300 TABLET, FILM COATED ORAL at 14:51

## 2019-07-15 RX ADMIN — Medication 50 MILLIGRAM(S): at 18:42

## 2019-07-15 RX ADMIN — Medication 650 MILLIGRAM(S): at 22:28

## 2019-07-15 RX ADMIN — ONDANSETRON 4 MILLIGRAM(S): 8 TABLET, FILM COATED ORAL at 12:28

## 2019-07-15 RX ADMIN — Medication 50 MILLIGRAM(S): at 06:18

## 2019-07-15 RX ADMIN — Medication 1 DROP(S): at 18:42

## 2019-07-15 RX ADMIN — DAPSONE 100 MILLIGRAM(S): 100 TABLET ORAL at 14:22

## 2019-07-15 NOTE — PROGRESS NOTE ADULT - SUBJECTIVE AND OBJECTIVE BOX
JESSICA ALVAREZ 23y Female  MRN#: [id number]   CODE STATUS:________      SUBJECTIVE  Patient is a 23y old Female who presents with a chief complaint of Righty sided weakness (15 Jul 2019 09:59)  Currently admitted to medicine with the primary diagnosis of Weakness of right upper extremity  Hospital course has been complicated by _______.   Today is hospital day 2d, and this morning she is _________ and reports ________ overnight events.     Present Today:           Domingo Catheter ()No/ ()Yes? Indication:          Central Line ()No/ ()Yes? Indication:          IV Fluids ()No/ ()Yes? Type:  Rate:  Indication:      OBJECTIVE  PAST MEDICAL & SURGICAL HISTORY  Anxiety  Bipolar disorder  HIV (human immunodeficiency virus infection)  No significant past surgical history    ALLERGIES:  Mushrooms (Pruritus)  No Known Drug Allergies    MEDICATIONS:  STANDING MEDICATIONS  artificial tears (preservative free) Ophthalmic Solution 1 Drop(s) Both EYES two times a day  chlorhexidine 4% Liquid 1 Application(s) Topical <User Schedule>  dapsone 100 milliGRAM(s) Oral daily  darunavir 800 mG/cobicstat 150 mG 1 Tablet(s) Oral daily  dolutegravir 50 milliGRAM(s) Oral daily  emtricitabine 200 mG/tenofovir alafenamide 25 mG (DESCOVY) Tablet 1 Tablet(s) Oral daily  hydrOXYzine hydrochloride 50 milliGRAM(s) Oral every 12 hours  lamoTRIgine 25 milliGRAM(s) Oral at bedtime    PRN MEDICATIONS      VITAL SIGNS: Last 24 Hours  T(C): 36.9 (15 Jul 2019 16:55), Max: 37 (15 Jul 2019 15:37)  T(F): 98.4 (15 Jul 2019 16:55), Max: 98.6 (15 Jul 2019 15:37)  HR: 73 (15 Jul 2019 16:55) (73 - 92)  BP: 97/65 (15 Jul 2019 16:55) (89/52 - 104/62)  BP(mean): --  RR: 18 (15 Jul 2019 16:55) (18 - 18)  SpO2: 99% (15 Jul 2019 15:37) (99% - 99%)    LABS:                        9.6    2.08  )-----------( 88       ( 15 Jul 2019 05:19 )             30.1     07-15    140  |  104  |  9<L>  ----------------------------<  97  4.0   |  23  |  0.8    Ca    9.3      15 Jul 2019 05:19    TPro  8.1<H>  /  Alb  4.0  /  TBili  0.2  /  DBili  x   /  AST  23  /  ALT  8   /  AlkPhos  51  07-15                  RADIOLOGY:      PHYSICAL EXAM:    GENERAL: NAD, well-developed, AAOx3  HEENT:  Atraumatic, Normocephalic. EOMI, PERRLA, conjunctiva and sclera clear, No JVD  PULMONARY: Clear to auscultation bilaterally; No wheeze  CARDIOVASCULAR: Regular rate and rhythm; No murmurs, rubs, or gallops  GASTROINTESTINAL: Soft, Nontender, Nondistended; Bowel sounds present  MUSCULOSKELETAL:  2+ Peripheral Pulses, No clubbing, cyanosis, or edema  NEUROLOGY: non-focal  SKIN: No rashes or lesions      ADMISSION SUMMARY  Patient is a 23y old Female who presents with a chief complaint of Righty sided weakness   Currently admitted to medicine with the primary diagnosis of Weakness of right upper extremity    ASSESSMENT & PLAN JESSICA ALVAREZ 23y Female  MRN#: [id number]     SUBJECTIVE  Patient is a 23y old Female who presents with a chief complaint of Right sided weakness   Currently admitted to medicine with the primary diagnosis of Weakness of right upper extremity  Today is hospital day 2d, and this morning she reports no overnight events.       OBJECTIVE  PAST MEDICAL & SURGICAL HISTORY  Anxiety  Bipolar disorder  HIV (human immunodeficiency virus infection)  No significant past surgical history    ALLERGIES:  Mushrooms (Pruritus)  No Known Drug Allergies    MEDICATIONS:  STANDING MEDICATIONS  artificial tears (preservative free) Ophthalmic Solution 1 Drop(s) Both EYES two times a day  chlorhexidine 4% Liquid 1 Application(s) Topical <User Schedule>  dapsone 100 milliGRAM(s) Oral daily  darunavir 800 mG/cobicstat 150 mG 1 Tablet(s) Oral daily  dolutegravir 50 milliGRAM(s) Oral daily  emtricitabine 200 mG/tenofovir alafenamide 25 mG (DESCOVY) Tablet 1 Tablet(s) Oral daily  hydrOXYzine hydrochloride 50 milliGRAM(s) Oral every 12 hours  lamoTRIgine 25 milliGRAM(s) Oral at bedtime    PRN MEDICATIONS      VITAL SIGNS: Last 24 Hours  T(C): 36.9 (15 Jul 2019 16:55), Max: 37 (15 Jul 2019 15:37)  T(F): 98.4 (15 Jul 2019 16:55), Max: 98.6 (15 Jul 2019 15:37)  HR: 73 (15 Jul 2019 16:55) (73 - 92)  BP: 97/65 (15 Jul 2019 16:55) (89/52 - 104/62)  BP(mean): --  RR: 18 (15 Jul 2019 16:55) (18 - 18)  SpO2: 99% (15 Jul 2019 15:37) (99% - 99%)    LABS:                        9.6    2.08  )-----------( 88       ( 15 Jul 2019 05:19 )             30.1     07-15    140  |  104  |  9<L>  ----------------------------<  97  4.0   |  23  |  0.8    Ca    9.3      15 Jul 2019 05:19    TPro  8.1<H>  /  Alb  4.0  /  TBili  0.2  /  DBili  x   /  AST  23  /  ALT  8   /  AlkPhos  51  07-15      RADIOLOGY:      < from: MR Head w/wo IV Cont (07.14.19 @ 22:16) >  IMPRESSION:     Focal white matter signal abnormality within the left frontal lobe   without contrast enhancement or significant mass effect.  The imaging   features (DWI signal hyperintensity, involvement of subcortical U-fibers)   suggest PML.  Possible minimal involvement of the right frontal lobe.    Case was discussed with Dr. Cedillo 11am 7/15/19     < end of copied text >    < from: MR Angio Head No Cont (07.14.19 @ 21:54) >    IMPRESSION:      Unremarkable MRA of the brain.      < end of copied text >      PHYSICAL EXAM:    GENERAL: NAD, well-developed, AAOx3  HEENT:  Atraumatic, Normocephalic. EOMI, PERRLA, conjunctiva and sclera clear, No JVD  PULMONARY: Clear to auscultation bilaterally; No wheeze  CARDIOVASCULAR: Regular rate and rhythm; No murmurs, rubs, or gallops  GASTROINTESTINAL: Soft, Nontender, Nondistended; Bowel sounds present  MUSCULOSKELETAL:  2+ Peripheral Pulses, No clubbing, cyanosis, or edema  NEUROLOGY: non-focal  SKIN: No rashes or lesions      ADMISSION SUMMARY  Patient is a 23y old Female who presents with a chief complaint of Righty sided weakness   Currently admitted to medicine with the primary diagnosis of Weakness of right upper extremity    ASSESSMENT & PLAN  24 yo F with vertical transmission AIDS, Bipolar disorder, anxiety, recent miscarriage, presenting with 2 days of sudden R-sided weakness    # Progressive multifocal Leucoencephalopathy  -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 Neuro exam showed Power 4/5 in RUE and 3/5 in RLE. No sensory deficits. Severe dysmetria present in RUE  -Patient refused LP to test for JOCELYNN virus. 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 Garcia at [facility]  - On Descovy, prezcobix, Dolutegravir  - Dapsone for prophylaxis    #Bipolar  - Lamictal 25mg PO and hydroxyzine     *DVT ppx: SCD   *regular diet  *Full code

## 2019-07-15 NOTE — PROGRESS NOTE ADULT - ATTENDING COMMENTS
Patient seen and examined and agree with above except as noted.  Patient with right sided weakness that has been present for 3 days.  MRI reviewed and appears to suggest subacute ischemia, PML or other etiology.  Exam shows right Leg>arm weakness symmetric sensation and no other findings.  NIHSS 2  mrankin 0    Plan   1. ID consult for HIV management  2. LP for cell count, TP, Glucose, MS panel, CSF JCV PCR ([facility]), bacterial, viral, fungal cultures  3. Serology for JOCELYNN virus, CD4, Viral load  4. REEG  5. f/u official MRI brain and MRA

## 2019-07-15 NOTE — PROGRESS NOTE ADULT - ASSESSMENT
22 yo F with vertical transmission AIDS, Bipolar disorder, anxiety, recent miscarriage, presenting with 2 days of sudden R-sided weakness    IMPRESSION/RECOMMENDATIONS  #Acute R-sided weakness: CTH negative for acute pathology. As patient reports last CD4 is <50 the differential is wide including PML, toxo, VZV vasculopathy, vs. 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 Garcia at [facility]  - On Descovy, prezcobix, DTG (discussed changing to another Integrase inhibitor as she is of child bearing age and increased risk of neural tube defects). Can change regimen to Biktarvy/Prezcobix. Will discuss with Dr. Garcia  - PPX Dapsone     #Bipolar  - Lamictal 25mg PO

## 2019-07-15 NOTE — PROGRESS NOTE ADULT - SUBJECTIVE AND OBJECTIVE BOX
JESSICA ALVAREZ  23y, Female  Allergy: Mushrooms (Pruritus)  No Known Drug Allergies      CHIEF COMPLAINT: Righty sided weakness (15 Jul 2019 01:07)      INTERVAL EVENTS/HPI  - No acute events overnight  - T(F): , Max: 98.4 (07-14-19 @ 19:45)  - Denies any worsening symptoms  - Tolerating medication  - WBC Count: 2.08 K/uL (07-15-19 @ 05:19)      ROS  General: Denies fevers, chills, nightsweats, weight loss  HEENT: Denies headache, rhinorrhea, sore throat, eye pain  CV: Denies CP, palpitations  PULM: Denies SOB, cough  GI: Denies abdominal pain, diarrhea  : Denies dysuria, hematuria  MSK: Denies arthralgias  SKIN: Denies rash   NEURO: Denies paresthesias, weakness  PSYCH: Denies depression    FH noncontributory     VITALS:  T(F): 98.4, Max: 98.4 (07-14-19 @ 19:45)  HR: 76  BP: 96/58  RR: 18Vital Signs Last 24 Hrs  T(C): 36.9 (15 Jul 2019 08:46), Max: 36.9 (14 Jul 2019 19:45)  T(F): 98.4 (15 Jul 2019 08:46), Max: 98.4 (14 Jul 2019 19:45)  HR: 76 (15 Jul 2019 08:46) (76 - 92)  BP: 96/58 (15 Jul 2019 08:46) (96/52 - 104/60)  BP(mean): --  RR: 18 (15 Jul 2019 08:46) (18 - 18)  SpO2: 99% (15 Jul 2019 08:46) (99% - 99%)    PHYSICAL EXAM:  Gen: NAD, resting in bed  HEENT: Normocephalic, atraumatic  Neck: supple, no lymphadenopathy  CV: Regular rate & regular rhythm  Lungs: decreased BS at bases, no fremitus  Abdomen: Soft, BS present  Ext: Warm, well perfused  Neuro: non focal, awake  Skin: no rash, no erythema      TESTS & MEASUREMENTS:                        9.6    2.08  )-----------( 88       ( 15 Jul 2019 05:19 )             30.1     07-15    140  |  104  |  9<L>  ----------------------------<  97  4.0   |  23  |  0.8    Ca    9.3      15 Jul 2019 05:19    TPro  8.1<H>  /  Alb  4.0  /  TBili  0.2  /  DBili  x   /  AST  23  /  ALT  8   /  AlkPhos  51  07-15    eGFR if Non African American: 105 mL/min/1.73M2 (07-15-19 @ 05:19)  eGFR if African American: 122 mL/min/1.73M2 (07-15-19 @ 05:19)    LIVER FUNCTIONS - ( 15 Jul 2019 05:19 )  Alb: 4.0 g/dL / Pro: 8.1 g/dL / ALK PHOS: 51 U/L / ALT: 8 U/L / AST: 23 U/L / GGT: x                     INFECTIOUS DISEASES TESTING      RADIOLOGY & ADDITIONAL TESTS:  I have personally reviewed the last Chest xray  CXR      CT      CARDIOLOGY TESTING      MEDICATIONS  artificial tears (preservative free) Ophthalmic Solution 1  chlorhexidine 4% Liquid 1  dapsone 100  darunavir 800 mG/cobicstat 150 mG 1  dolutegravir 50  emtricitabine 200 mG/tenofovir alafenamide 25 mG (DESCOVY) Tablet 1  hydrOXYzine hydrochloride 50  lamoTRIgine 25      ANTIBIOTICS:  dapsone 100 milliGRAM(s) Oral daily  darunavir 800 mG/cobicstat 150 mG 1 Tablet(s) Oral daily  dolutegravir 50 milliGRAM(s) Oral daily  emtricitabine 200 mG/tenofovir alafenamide 25 mG (DESCOVY) Tablet 1 Tablet(s) Oral daily

## 2019-07-15 NOTE — PROGRESS NOTE ADULT - ASSESSMENT
24 yo right handed female with pmhx HIV (unknown last CD4 count but states she thinks it was in the 30s? - last tested March 2019 per patient, but most recent record in [facility] shows 215 in March 2016), bipolar disorder, presenting with a 2 day history of right  RUE and RLE weakness. NIHSS 3. CTH negative for acute findings.                                  R/o CVA      Plan  Admit to stroke unit  follow up pending results of  mri /A brain  Continue asa 81 mg q 24  Continue  Lipitor 40mg q 24  follow up pending Echo with bubble  Lipid profile , hbaic , hypercoaguable labs  pt /rehab  Speech and swallow  Neuro attending note will follow

## 2019-07-15 NOTE — ED ADULT NURSE REASSESSMENT NOTE - NS ED NURSE REASSESS COMMENT FT1
pt a&ox4, pt offers no complaints at this time, denies CP/SOB. pt able to BERNAL, admitted to medicine for R side weakness.

## 2019-07-15 NOTE — PHYSICAL THERAPY INITIAL EVALUATION ADULT - GENERAL OBSERVATIONS, REHAB EVAL
Attempted to see pt for PT Initial Evaluation, pt currently sleeping, requesting for PT to come back later 2* to fatigue. Will f/u for PT.

## 2019-07-15 NOTE — PROGRESS NOTE ADULT - SUBJECTIVE AND OBJECTIVE BOX
Neurology Follow up note  Patient seen and examined at bedside she denies any new complaints , right sided weakness persists. No acute overnight events.    Vital Signs Last 24 Hrs  T(C): 36.1 (15 Jul 2019 00:29), Max: 36.9 (14 Jul 2019 19:45)  T(F): 97 (15 Jul 2019 00:29), Max: 98.4 (14 Jul 2019 19:45)  HR: 82 (15 Jul 2019 00:29) (82 - 92)  BP: 96/52 (15 Jul 2019 00:29) (96/52 - 104/60)  BP(mean): --  RR: 18 (15 Jul 2019 00:29) (18 - 18)  SpO2: 99% (15 Jul 2019 00:29) (99% - 99%)          Neurological Exam:   Orientation: oriented to person, place time and situation, speech and language intact  Cranial Nerves: visual acuity intact bilaterally, visual fields full to confrontation, pupils equal round and reactive to light, extraocular motion intact, facial sensation intact symmetrically, face symmetrical, hearing was intact bilaterally, tongue and palate midline, head turning and shoulder shrug symmetric and there was no tongue deviation with protrusion.   Motor: RUE 4+/5   RLE 4-/5             LUE  5/5     LLE 5/5   Sensory exam:  Intact  Coordination:. Mild dysmetria on the right , limb ataxia on the right  DTR: Brisk reflexes throughout  gait: deferred    NIHSS: 3   Loc  0   commands  0    questions   0  vf 0  gaze  0  Face 0  ataxia 1  RUE 1  RLE  1  LUE 0  LLE0  Sensory  0  speech 0  language 0  extinction 0    mRS Baseline 0  mRs current 2      Medications  artificial tears (preservative free) Ophthalmic Solution 1 Drop(s) Both EYES two times a day  chlorhexidine 4% Liquid 1 Application(s) Topical <User Schedule>  dapsone 100 milliGRAM(s) Oral daily  darunavir 800 mG/cobicstat 150 mG 1 Tablet(s) Oral daily  dolutegravir 50 milliGRAM(s) Oral daily  emtricitabine 200 mG/tenofovir alafenamide 25 mG (DESCOVY) Tablet 1 Tablet(s) Oral daily  hydrOXYzine hydrochloride 50 milliGRAM(s) Oral every 12 hours  lamoTRIgine 25 milliGRAM(s) Oral at bedtime      Lab  Comprehensive Metabolic Panel (07.13.19 @ 19:00)    Sodium, Serum: 139 mmol/L    Potassium, Serum: 3.6 mmol/L    Chloride, Serum: 101 mmol/L    Carbon Dioxide, Serum: 22 mmol/L    Anion Gap, Serum: 16 mmol/L    Blood Urea Nitrogen, Serum: 8 mg/dL    Creatinine, Serum: 0.6 mg/dL    Glucose, Serum: 91 mg/dL    Calcium, Total Serum: 9.4 mg/dL    Protein Total, Serum: 9.0 g/dL    Albumin, Serum: 4.7 g/dL    Bilirubin Total, Serum: 0.3 mg/dL    Alkaline Phosphatase, Serum: 56 U/L    Aspartate Aminotransferase (AST/SGOT): 25 U/L    Alanine Aminotransferase (ALT/SGPT): 9 U/L    eGFR if Non : 130: Interpretative comment    Complete Blood Count + Automated Diff (07.13.19 @ 19:00)    WBC Count: 3.01 K/uL    RBC Count: 3.62 M/uL    Hemoglobin: 9.9 g/dL    Hematocrit: 30.7 %    Mean Cell Volume: 84.8 fL    Mean Cell Hemoglobin: 27.3 pg    Mean Cell Hemoglobin Conc: 32.2 g/dL    Red Cell Distrib Width: 12.6 %    Platelet Count - Automated: 94 K/uL    Auto Neutrophil #: 2.18 K/uL    Auto Lymphocyte #: 0.50 K/uL    Auto Monocyte #: 0.11 K/uL    Auto Eosinophil #: 0.11 K/uL    Auto Basophil #: 0.00 K/uL    Auto Neutrophil %: 69.7: Differential percentages must be correlated with absolute numbers for  clinical significance. %    Auto Lymphocyte %: 16.5 %    Auto Monocyte %: 3.7 %    Auto Eosinophil %: 3.7 %    Auto Basophil %: 0.0 %          Radiology

## 2019-07-15 NOTE — PHYSICAL THERAPY INITIAL EVALUATION ADULT - GAIT DEVIATIONS NOTED, PT EVAL
decreased R hip flexion, occasional hyperextension to R knee in stance, decreased heel strike, mild external rotation at hip during stance

## 2019-07-16 LAB
ANION GAP SERPL CALC-SCNC: 12 MMOL/L — SIGNIFICANT CHANGE UP (ref 7–14)
APPEARANCE CSF: ABNORMAL
APPEARANCE SPUN FLD: COLORLESS — SIGNIFICANT CHANGE UP
APTT BLD: 35.3 SEC — SIGNIFICANT CHANGE UP (ref 27–39.2)
BUN SERPL-MCNC: 11 MG/DL — SIGNIFICANT CHANGE UP (ref 10–20)
CALCIUM SERPL-MCNC: 9.1 MG/DL — SIGNIFICANT CHANGE UP (ref 8.5–10.1)
CHLORIDE SERPL-SCNC: 105 MMOL/L — SIGNIFICANT CHANGE UP (ref 98–110)
CO2 SERPL-SCNC: 21 MMOL/L — SIGNIFICANT CHANGE UP (ref 17–32)
COLOR CSF: COLORLESS — SIGNIFICANT CHANGE UP
CREAT SERPL-MCNC: 0.8 MG/DL — SIGNIFICANT CHANGE UP (ref 0.7–1.5)
CRYPTOC AG FLD QL: NEGATIVE — SIGNIFICANT CHANGE UP
GLUCOSE CSF-MCNC: 55 MG/DL — SIGNIFICANT CHANGE UP (ref 45–75)
GLUCOSE SERPL-MCNC: 88 MG/DL — SIGNIFICANT CHANGE UP (ref 70–99)
HCT VFR BLD CALC: 29.1 % — LOW (ref 37–47)
HGB BLD-MCNC: 9.4 G/DL — LOW (ref 12–16)
INR BLD: 1.11 RATIO — SIGNIFICANT CHANGE UP (ref 0.65–1.3)
LYMPHOCYTES # CSF: 1 % — SIGNIFICANT CHANGE UP (ref 40–80)
MAGNESIUM SERPL-MCNC: 2.1 MG/DL — SIGNIFICANT CHANGE UP (ref 1.8–2.4)
MCHC RBC-ENTMCNC: 27.4 PG — SIGNIFICANT CHANGE UP (ref 27–31)
MCHC RBC-ENTMCNC: 32.3 G/DL — SIGNIFICANT CHANGE UP (ref 32–37)
MCV RBC AUTO: 84.8 FL — SIGNIFICANT CHANGE UP (ref 81–99)
NEUTROPHILS # CSF: SIGNIFICANT CHANGE UP % (ref 0–6)
NRBC # BLD: 0 /100 WBCS — SIGNIFICANT CHANGE UP (ref 0–0)
NRBC NFR CSF: 1 /UL — SIGNIFICANT CHANGE UP (ref 0–5)
PLATELET # BLD AUTO: 107 K/UL — LOW (ref 130–400)
POTASSIUM SERPL-MCNC: 4 MMOL/L — SIGNIFICANT CHANGE UP (ref 3.5–5)
POTASSIUM SERPL-SCNC: 4 MMOL/L — SIGNIFICANT CHANGE UP (ref 3.5–5)
PROT CSF-MCNC: 31 MG/DL — SIGNIFICANT CHANGE UP (ref 15–45)
PROTHROM AB SERPL-ACNC: 12.8 SEC — SIGNIFICANT CHANGE UP (ref 9.95–12.87)
RBC # BLD: 3.43 M/UL — LOW (ref 4.2–5.4)
RBC # CSF: 950 /UL — SIGNIFICANT CHANGE UP (ref 0–0)
RBC # FLD: 12.6 % — SIGNIFICANT CHANGE UP (ref 11.5–14.5)
SODIUM SERPL-SCNC: 138 MMOL/L — SIGNIFICANT CHANGE UP (ref 135–146)
T GONDII IGG SER QL: <3 IU/ML — SIGNIFICANT CHANGE UP
T GONDII IGG SER QL: NEGATIVE — SIGNIFICANT CHANGE UP
T GONDII IGM SER QL: <3 AU/ML — SIGNIFICANT CHANGE UP
T GONDII IGM SER QL: NEGATIVE — SIGNIFICANT CHANGE UP
TUBE TYPE: SIGNIFICANT CHANGE UP
WBC # BLD: 2.25 K/UL — LOW (ref 4.8–10.8)
WBC # FLD AUTO: 2.25 K/UL — LOW (ref 4.8–10.8)

## 2019-07-16 RX ORDER — IBUPROFEN 200 MG
400 TABLET ORAL EVERY 8 HOURS
Refills: 0 | Status: DISCONTINUED | OUTPATIENT
Start: 2019-07-16 | End: 2019-07-19

## 2019-07-16 RX ORDER — LIDOCAINE 4 G/100G
1 CREAM TOPICAL ONCE
Refills: 0 | Status: COMPLETED | OUTPATIENT
Start: 2019-07-16 | End: 2019-07-16

## 2019-07-16 RX ADMIN — Medication 400 MILLIGRAM(S): at 18:46

## 2019-07-16 RX ADMIN — DAPSONE 100 MILLIGRAM(S): 100 TABLET ORAL at 13:04

## 2019-07-16 RX ADMIN — Medication 50 MILLIGRAM(S): at 18:07

## 2019-07-16 RX ADMIN — Medication 1 DROP(S): at 18:07

## 2019-07-16 RX ADMIN — Medication 50 MILLIGRAM(S): at 06:05

## 2019-07-16 RX ADMIN — EMTRICITABINE AND TENOFOVIR DISOPROXIL FUMARATE 1 TABLET(S): 200; 300 TABLET, FILM COATED ORAL at 13:05

## 2019-07-16 RX ADMIN — DOLUTEGRAVIR SODIUM 50 MILLIGRAM(S): 25 TABLET, FILM COATED ORAL at 13:04

## 2019-07-16 RX ADMIN — Medication 1 DROP(S): at 06:06

## 2019-07-16 RX ADMIN — DARUNAVIR ETHANOLATE AND COBICISTAT 1 TABLET(S): 800; 150 TABLET, FILM COATED ORAL at 13:04

## 2019-07-16 RX ADMIN — LIDOCAINE 1 PATCH: 4 CREAM TOPICAL at 23:22

## 2019-07-16 RX ADMIN — LAMOTRIGINE 25 MILLIGRAM(S): 25 TABLET, ORALLY DISINTEGRATING ORAL at 21:21

## 2019-07-16 NOTE — PROGRESS NOTE ADULT - SUBJECTIVE AND OBJECTIVE BOX
JESSICA ALVAREZ  23y, Female  Allergy: Mushrooms (Pruritus)  No Known Drug Allergies      CHIEF COMPLAINT: Righty sided weakness (15 Jul 2019 16:58)      INTERVAL EVENTS/HPI  - No acute events overnight, continued weakness  - T(F): , Max: 98.7 (07-16-19 @ 06:07)  - Denies any worsening symptoms  - Tolerating medication  - WBC Count: 2.25 K/uL (07-16-19 @ 06:07)      ROS  General: Denies rigors, nightsweats  HEENT: Denies headache, rhinorrhea, sore throat, eye pain  CV: Denies CP, palpitations  PULM: Denies SOB, wheezing  GI: Denies abdominal pain, hematochezia/melena  : Denies dysuria, hematuria  MSK: Denies arthralgias, myalgias  SKIN: Denies rash, lesions  NEURO: as above   PSYCH: Denies depression, anxiety    VITALS:  T(F): 98.7, Max: 98.7 (07-16-19 @ 06:07)  HR: 73  BP: 109/73  RR: 17Vital Signs Last 24 Hrs  T(C): 37.1 (16 Jul 2019 06:07), Max: 37.1 (16 Jul 2019 06:07)  T(F): 98.7 (16 Jul 2019 06:07), Max: 98.7 (16 Jul 2019 06:07)  HR: 73 (16 Jul 2019 06:07) (73 - 85)  BP: 109/73 (16 Jul 2019 06:07) (89/52 - 109/73)  BP(mean): --  RR: 17 (16 Jul 2019 06:07) (17 - 18)  SpO2: 99% (15 Jul 2019 17:00) (99% - 99%)      PHYSICAL EXAM:  Gen: NAD, resting in bed  HEENT: Normocephalic, atraumatic, pupils dilated and reactive, equal  Neck: supple, no lymphadenopathy  CV: Regular rate & regular rhythm  Lungs: CTAB  Abdomen: Soft, BS present  Ext: Warm, well perfused  Neuro: RUE and RLE 4/5, hyperreflexia, dysmetria   Skin: no rash, no erythema  Lines: no phlebitis      FH: Non-contributory  Social Hx: Non-contributory    TESTS & MEASUREMENTS:                        9.4    2.25  )-----------( 107      ( 16 Jul 2019 06:07 )             29.1     07-16    138  |  105  |  11  ----------------------------<  88  4.0   |  21  |  0.8    Ca    9.1      16 Jul 2019 06:07  Mg     2.1     07-16    TPro  8.1<H>  /  Alb  4.0  /  TBili  0.2  /  DBili  x   /  AST  23  /  ALT  8   /  AlkPhos  51  07-15    eGFR if Non African American: 105 mL/min/1.73M2 (07-16-19 @ 06:07)  eGFR if African American: 122 mL/min/1.73M2 (07-16-19 @ 06:07)    LIVER FUNCTIONS - ( 15 Jul 2019 05:19 )  Alb: 4.0 g/dL / Pro: 8.1 g/dL / ALK PHOS: 51 U/L / ALT: 8 U/L / AST: 23 U/L / GGT: x               Culture - Blood (collected 07-14-19 @ 12:48)  Source: .Blood Blood  Preliminary Report (07-15-19 @ 21:01):    No growth to date.            INFECTIOUS DISEASES TESTING      RADIOLOGY & ADDITIONAL TESTS:  I have personally reviewed the last Chest xray  CXR      CT      CARDIOLOGY TESTING  Transthoracic Echocardiogram:    EXAM:  2-D ECHO (TTE) COMPLETE        PROCEDURE DATE:  07/15/2019      INTERPRETATION:  REPORT:    TRANSTHORACIC ECHOCARDIOGRAM REPORT         Patient Name:   JESSICA ALVAREZ Accession #: [id number]  Medical Rec #:  [id number]      Height:      61.0 in 154.9 cm  YOB: 1995      Weight:      120.0 lb 54.43 kg  Patient Age:    23 years       BSA:         1.52 m²  Patient Gender: F              BP:          104/62 mmHg       Date of Exam:        7/15/2019 1:02:45 PM  Referring Physician: [id number] ED UNASSIGNED  Sonographer:         Annamarie Stuart  Reading Physician:   Gregory Wright MD.    Procedure:   2D Echo/Doppler/Color Doppler Complete and Saline Contrast   (Bubble               Study).  Indications: R07.9 - Chest Pain, unspecified  Diagnosis:   Chest pain, unspecified - R07.9         Summary:   1. LV Ejection Fraction by Saldivar's Method with a biplane EF of 57 %.   2. Normal left ventricular size and wall thicknesses, with normal   systolic and diastolic function.   3. Color flow doppler and intravenous injection of agitated saline   demonstrates the presence of an intact intra atrial septum.   4. LA volume Index is 28.1 ml/m² ml/m2.    PHYSICIAN INTERPRETATION:  Left Ventricle: Normal left ventricular size and wall thicknesses, with   normal systolic and diastolic function.  Right Ventricle: Normal right ventricular size and function.  Left Atrium: Normal left atrial size. Color flow doppler and intravenous   injection of agitated saline demonstrates the presence of an intact intra   atrial septum. LA volume Index is 28.1 ml/m² ml/m2.  Right Atrium: Normal right atrial size.  Pericardium: There is no evidence of pericardial effusion.  Mitral Valve: Structurally normal mitral valve, with normal leaflet   excursion. The mitral valve is normal in structure. No evidence of mitral   valve regurgitation is seen.  Tricuspid Valve: Structurally normal tricuspid valve, with normal leaflet   excursion. The tricuspid valve is normal in structure. Mild tricuspid   regurgitation is visualized.  Aortic Valve: Normal trileaflet aortic valve with normal opening. The   aortic valve is normal. No evidence of aortic valve regurgitation is seen.  Pulmonic Valve: Structurally normal pulmonic valve, with normal leaflet  excursion. The pulmonic valve is normal. No indication of pulmonic valve   regurgitation.  Aorta: The aortic root and ascending aorta are structurally normal, with   no evidence of dilitation.  Pulmonary Artery: The main pulmonary artery is normal in size.  Venous: The inferior vena cava was normal sized, with respiratory size   variation greater than 50%.  Shunts: Agitated saline contrast was given intravenously to evaluate for   intracardiac shunting. There is no evidence of a patent foramen ovale.   There is no evidence of any atrial septal defect.       2D AND M-MODE MEASUREMENTS (normal ranges within parentheses):  Left                  Normal   Aorta/Left             Normal  Ventricle:                     Atrium:  IVSd (2D):  0.77 cm(0.7-1.1) AoV Cusp       1.95  (1.5-2.6)  LVPWd       0.74 cm  (0.7-1.1) Separation:     cm  (2D):                          Left Atrium    2.85  (1.9-4.0)  LVIDd       4.18 cm  (3.4-5.7) (Mmode):        cm  (2D):                          LA Volume  28.1  LVIDs       2.89 cm            Index         ml/m²  (2D):                          Right  LV FS       30.8 %    (>25%)   Ventricle:  (2D):                          RVd (2D):      2.62 cm  IVSd        0.64 cm  (0.7-1.1) TAPSE:         2.10 cm  (Mmode):  LVPWd       0.62 cm  (0.7-1.1)  (Mmode):  LVIDd       4.65 cm  (3.4-5.7)  (Mmode):  LVIDs       3.11 cm  (Mmode):  LV FS       33.0 %    (>25%)  (Mmode):  Relative     0.36     (<0.42)  Wall  Thickness  Rel. Wall    0.27     (<0.42)  Thickness  Mm  LV Mass    58.7 g/m²  Index:  Mmode    SPECTRAL DOPPLER ANALYSIS:  LV DIASTOLIC FUNCTION:  MV Peak E: 0.73 m/s Decel Time: 232 msec  MV Peak A: 0.42 m/s  E/A Ratio: 1.72    Aortic Valve:  AoV VMax:    1.26 m/s AoV Area, Vmax: 1.80 cm² Vmax Indx: 1.18 cm²/m²  AoV Pk Grad: 6.3 mmHg    LVOT Vmax: 0.97 m/s  LVOT VTI:  0.21 m  LVOT Diam: 1.72 cm    Mitral Valve:  MV P1/2 Time: 67.14 msec  MV Area, PHT: 3.28 cm²    Tricuspid Valve and PA/RV Systolic Pressure: TR Max Velocity: 2.33 m/s RA   Pressure: 3 mmHg RVSP/PASP: 24.8 mmHg       [id number] Gregory Wright MD, Electronically signed on 7/15/2019 at 1:50:28   PM              *** Final ***                    GREGORY WRIGHT MD  This document has been electronically signed. Jul 15 2019  1:02PM             (07-15-19 @ 13:02)      MEDICATIONS  artificial tears (preservative free) Ophthalmic Solution 1  chlorhexidine 4% Liquid 1  dapsone 100  darunavir 800 mG/cobicstat 150 mG 1  dolutegravir 50  emtricitabine 200 mG/tenofovir alafenamide 25 mG (DESCOVY) Tablet 1  hydrOXYzine hydrochloride 50  lamoTRIgine 25      ANTIBIOTICS:  dapsone 100 milliGRAM(s) Oral daily  darunavir 800 mG/cobicstat 150 mG 1 Tablet(s) Oral daily  dolutegravir 50 milliGRAM(s) Oral daily  emtricitabine 200 mG/tenofovir alafenamide 25 mG (DESCOVY) Tablet 1 Tablet(s) Oral daily

## 2019-07-16 NOTE — PROGRESS NOTE ADULT - SUBJECTIVE AND OBJECTIVE BOX
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     OBJECTIVE  PAST MEDICAL & SURGICAL HISTORY  Anxiety  Bipolar disorder  HIV (human immunodeficiency virus infection)  No significant past surgical history    ALLERGIES:  Mushrooms (Pruritus)  No Known Drug Allergies    MEDICATIONS:  STANDING MEDICATIONS  artificial tears (preservative free) Ophthalmic Solution 1 Drop(s) Both EYES two times a day  chlorhexidine 4% Liquid 1 Application(s) Topical <User Schedule>  dapsone 100 milliGRAM(s) Oral daily  darunavir 800 mG/cobicstat 150 mG 1 Tablet(s) Oral daily  dolutegravir 50 milliGRAM(s) Oral daily  emtricitabine 200 mG/tenofovir alafenamide 25 mG (DESCOVY) Tablet 1 Tablet(s) Oral daily  hydrOXYzine hydrochloride 50 milliGRAM(s) Oral every 12 hours  lamoTRIgine 25 milliGRAM(s) Oral at bedtime    PRN MEDICATIONS  acetaminophen   Tablet .. 650 milliGRAM(s) Oral every 6 hours PRN  ibuprofen  Tablet. 400 milliGRAM(s) Oral every 8 hours PRN      VITAL SIGNS: Last 24 Hours  T(C): 36.8 (16 Jul 2019 14:24), Max: 37.1 (16 Jul 2019 06:07)  T(F): 98.2 (16 Jul 2019 14:24), Max: 98.7 (16 Jul 2019 06:07)  HR: 86 (16 Jul 2019 14:24) (71 - 86)  BP: 100/63 (16 Jul 2019 14:24) (96/60 - 109/73)  BP(mean): --  RR: 16 (16 Jul 2019 14:24) (16 - 18)  SpO2: --    LABS:                        9.4    2.25  )-----------( 107      ( 16 Jul 2019 06:07 )             29.1     07-16    138  |  105  |  11  ----------------------------<  88  4.0   |  21  |  0.8    Ca    9.1      16 Jul 2019 06:07  Mg     2.1     07-16    TPro  8.1<H>  /  Alb  4.0  /  TBili  0.2  /  DBili  x   /  AST  23  /  ALT  8   /  AlkPhos  51  07-15    PT/INR - ( 16 Jul 2019 12:37 )   PT: 12.80 sec;   INR: 1.11 ratio         PTT - ( 16 Jul 2019 12:37 )  PTT:35.3 sec          Culture - Blood (collected 14 Jul 2019 12:48)  Source: .Blood Blood  Preliminary Report (15 Jul 2019 21:01):    No growth to date.          RADIOLOGY:      PHYSICAL EXAM:    GENERAL: NAD, well-developed female, AAOx3  HEENT:  Atraumatic, Normocephalic. EOMI, PERRLA, conjunctiva and sclera clear, No JVD  PULMONARY: Clear to auscultation bilaterally; No wheeze  CARDIOVASCULAR: Regular rate and rhythm; No murmurs, rubs, or gallops  GASTROINTESTINAL: Soft, Nontender, Nondistended; Bowel sounds present  MUSCULOSKELETAL:  2+ Peripheral Pulses, No clubbing, cyanosis, or edema  NEUROLOGY: CN II-XII intact, 4/5 strength in R shoulder shrug, 5/5 muscle strength in L shoulder shrug, 4+/5 strength in L arm flexion, extension, and  strength; 4-/5 strength in L arm flexion, extension, and  strength; 5/5 muscle strength in LLE; 4/5 strength in RLE; gait unchecked; full sensation throughout body; dysmetria noted in R arm with finger-to-nose test and R leg with heel-to-shin test; DTR were 2+/4 b/l for C5, C7, and L4; Dysdiadokinesia present in R hand  SKIN: No rashes or lesions      ADMISSION SUMMARY  Patient is a 23y old Female with 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 WEAKNESS OF RIGHT UPPER EXTREMITY WEAKNESS OF RIGHT SIDE OF FACE HIV      2.    3. 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     OBJECTIVE  PAST MEDICAL & SURGICAL HISTORY  Anxiety  Bipolar disorder  HIV (human immunodeficiency virus infection)  No significant past surgical history    ALLERGIES:  Mushrooms (Pruritus)  No Known Drug Allergies    MEDICATIONS:  STANDING MEDICATIONS  artificial tears (preservative free) Ophthalmic Solution 1 Drop(s) Both EYES two times a day  chlorhexidine 4% Liquid 1 Application(s) Topical <User Schedule>  dapsone 100 milliGRAM(s) Oral daily  darunavir 800 mG/cobicstat 150 mG 1 Tablet(s) Oral daily  dolutegravir 50 milliGRAM(s) Oral daily  emtricitabine 200 mG/tenofovir alafenamide 25 mG (DESCOVY) Tablet 1 Tablet(s) Oral daily  hydrOXYzine hydrochloride 50 milliGRAM(s) Oral every 12 hours  lamoTRIgine 25 milliGRAM(s) Oral at bedtime    PRN MEDICATIONS  acetaminophen   Tablet .. 650 milliGRAM(s) Oral every 6 hours PRN  ibuprofen  Tablet. 400 milliGRAM(s) Oral every 8 hours PRN      VITAL SIGNS: Last 24 Hours  T(C): 36.8 (16 Jul 2019 14:24), Max: 37.1 (16 Jul 2019 06:07)  T(F): 98.2 (16 Jul 2019 14:24), Max: 98.7 (16 Jul 2019 06:07)  HR: 86 (16 Jul 2019 14:24) (71 - 86)  BP: 100/63 (16 Jul 2019 14:24) (96/60 - 109/73)  BP(mean): --  RR: 16 (16 Jul 2019 14:24) (16 - 18)  SpO2: --    LABS:                        9.4    2.25  )-----------( 107      ( 16 Jul 2019 06:07 )             29.1     07-16    138  |  105  |  11  ----------------------------<  88  4.0   |  21  |  0.8    Ca    9.1      16 Jul 2019 06:07  Mg     2.1     07-16    TPro  8.1<H>  /  Alb  4.0  /  TBili  0.2  /  DBili  x   /  AST  23  /  ALT  8   /  AlkPhos  51  07-15    PT/INR - ( 16 Jul 2019 12:37 )   PT: 12.80 sec;   INR: 1.11 ratio         PTT - ( 16 Jul 2019 12:37 )  PTT:35.3 sec          Culture - Blood (collected 14 Jul 2019 12:48)  Source: .Blood Blood  Preliminary Report (15 Jul 2019 21:01):    No growth to date.          RADIOLOGY:      PHYSICAL EXAM:    GENERAL: NAD, well-developed female, AAOx3  HEENT:  Atraumatic, Normocephalic. EOMI, PERRLA, conjunctiva and sclera clear, No JVD  PULMONARY: Clear to auscultation bilaterally; No wheeze  CARDIOVASCULAR: Regular rate and rhythm; No murmurs, rubs, or gallops  GASTROINTESTINAL: Soft, Nontender, Nondistended; Bowel sounds present  MUSCULOSKELETAL:  2+ Peripheral Pulses, No clubbing, cyanosis, or edema  NEUROLOGY: CN II-XII intact, 4/5 strength in R shoulder shrug, 5/5 muscle strength in L shoulder shrug, 4+/5 strength in L arm flexion, extension, and  strength; 4-/5 strength in L arm flexion, extension, and  strength; 5/5 muscle strength in LLE; 4/5 strength in RLE; gait unchecked; full sensation throughout body; dysmetria noted in R arm with finger-to-nose test and R leg with heel-to-shin test; DTR were 2+/4 b/l for C5, C7, and L4; Dysdiadokinesia present in R hand  SKIN: No rashes or lesions      ADMISSION SUMMARY  Patient is a 23y old Female with 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, bipolar disorder, and anxiety who presents with a chief complaint of Righty sided weakness (16 Jul 2019 11:47)  Currently admitted to medicine with the primary diagnosis of Weakness of right upper extremity      ASSESSMENT & PLAN  #Acute right-sided weakness likely due to PML  - MRI findings suggestive of PML  - Toxoplasmosis IgG and IgM negative  - CrAg serum negative  - Neurology consult appreciated  - LP performed; will follow  - Treatment of PML is to continue ART    #Symptomatic AIDS with most recent CD4 count of 16  - VL  - Check RPR  - Attending is attempting to obtain baseline from PCP (Dr. Garcia)  - On Descovy, prezcobix, DTG (Will discuss with PCP changing to another Integrase inhibitor as she is of child bearing age and increased risk of neural tube defects). Can change regimen to Biktarvy/Prezcobix. Will discuss with Dr. Garcia  - Continue with dapsone 100mg PO QD for prophylaxis  - Recent studies show no benefit for MAC prophylaxis; will not administer azithromycin  - Will need outpatient genotype to check for HIV resistance    #Pancytopenia  - Attending is attempting to obtain baseline from PCP  - Follow peripheral smear  - Follow iron studies, ferritin  - Alk phos is within normal limits  - Acid fast blood culture  - No febrile illness to suggest EBV or CMV  - Adverse reaction with hemolytic anemia and dapsone if patient has G6P deficiency, should not affect platelets    #Bipolar disorder  - Continue with lamotrigine 25mg PO QD    #Misc  - DVT Prophylaxis: Not indicated  - Diet: Regular  - GI Prophylaxis: Not indicated  - Activity: Out of bed with assistance  - IV Fluids: Encourage PO intake    Dispo:    ( ) Discussion with patient and/or family regarding goals of care  ( ) Discussed Case and Plan with Medical Attending, Name: JESSICA ALVAREZ 23y Female  MRN#: [id number]   CODE STATUS: Full Code    SUBJECTIVE  Patient is a 23y old Female with 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     OBJECTIVE  PAST MEDICAL & SURGICAL HISTORY  Anxiety  Bipolar disorder  HIV (human immunodeficiency virus infection)  No significant past surgical history    ALLERGIES:  Mushrooms (Pruritus)  No Known Drug Allergies    MEDICATIONS:  STANDING MEDICATIONS  artificial tears (preservative free) Ophthalmic Solution 1 Drop(s) Both EYES two times a day  chlorhexidine 4% Liquid 1 Application(s) Topical <User Schedule>  dapsone 100 milliGRAM(s) Oral daily  darunavir 800 mG/cobicstat 150 mG 1 Tablet(s) Oral daily  dolutegravir 50 milliGRAM(s) Oral daily  emtricitabine 200 mG/tenofovir alafenamide 25 mG (DESCOVY) Tablet 1 Tablet(s) Oral daily  hydrOXYzine hydrochloride 50 milliGRAM(s) Oral every 12 hours  lamoTRIgine 25 milliGRAM(s) Oral at bedtime    PRN MEDICATIONS  acetaminophen   Tablet .. 650 milliGRAM(s) Oral every 6 hours PRN  ibuprofen  Tablet. 400 milliGRAM(s) Oral every 8 hours PRN      VITAL SIGNS: Last 24 Hours  T(C): 36.8 (16 Jul 2019 14:24), Max: 37.1 (16 Jul 2019 06:07)  T(F): 98.2 (16 Jul 2019 14:24), Max: 98.7 (16 Jul 2019 06:07)  HR: 86 (16 Jul 2019 14:24) (71 - 86)  BP: 100/63 (16 Jul 2019 14:24) (96/60 - 109/73)  BP(mean): --  RR: 16 (16 Jul 2019 14:24) (16 - 18)  SpO2: --    LABS:                        9.4    2.25  )-----------( 107      ( 16 Jul 2019 06:07 )             29.1     07-16    138  |  105  |  11  ----------------------------<  88  4.0   |  21  |  0.8    Ca    9.1      16 Jul 2019 06:07  Mg     2.1     07-16    TPro  8.1<H>  /  Alb  4.0  /  TBili  0.2  /  DBili  x   /  AST  23  /  ALT  8   /  AlkPhos  51  07-15    PT/INR - ( 16 Jul 2019 12:37 )   PT: 12.80 sec;   INR: 1.11 ratio         PTT - ( 16 Jul 2019 12:37 )  PTT:35.3 sec          Culture - Blood (collected 14 Jul 2019 12:48)  Source: .Blood Blood  Preliminary Report (15 Jul 2019 21:01):    No growth to date.          RADIOLOGY:  < from: MR Head w/wo IV Cont (07.14.19 @ 22:16) >  FINDINGS:    Ventricles and cortical sulci are symmetric and compatible with a slight   degree of cerebral volume loss for age.    There is focal white matter signal abnormality in the left frontal lobe   with involvement of the subcortical U fibers and DWI signal   hyperintensity. There is also involvement of the right frontal lobe on   series 6 image 20. There is no significant mass effect. There is no   evidence of associated enhancement.    There is no evidence of acute intracranial hemorrhage, extra-axial fluid   collection or midline shift. There is normal flow void present within   the major vascular structures.      There is no evidence for pathologic intracranial enhancement.    The orbits, sinuses, and mastoid complexes are unremarkable.     IMPRESSION:     Focal white matter signal abnormality within the left frontal lobe   without contrast enhancement or significant mass effect.  The imaging   features (DWI signal hyperintensity, involvement of subcortical U-fibers)   suggest PML.  Possible minimal involvement of the right frontal lobe.    < end of copied text >      PHYSICAL EXAM:    GENERAL: NAD, well-developed female, AAOx3  HEENT:  Atraumatic, Normocephalic. EOMI, PERRLA, conjunctiva and sclera clear, No JVD  PULMONARY: Clear to auscultation bilaterally; No wheeze  CARDIOVASCULAR: Regular rate and rhythm; No murmurs, rubs, or gallops  GASTROINTESTINAL: Soft, Nontender, Nondistended; Bowel sounds present  MUSCULOSKELETAL:  2+ Peripheral Pulses, No clubbing, cyanosis, or edema  NEUROLOGY: CN II-XII intact, 4/5 strength in R shoulder shrug, 5/5 muscle strength in L shoulder shrug, 4+/5 strength in L arm flexion, extension, and  strength; 4-/5 strength in L arm flexion, extension, and  strength; 5/5 muscle strength in LLE; 4/5 strength in RLE; gait unchecked; full sensation throughout body; dysmetria noted in R arm with finger-to-nose test and R leg with heel-to-shin test; DTR were 2+/4 b/l for C5, C7, and L4; Dysdiadokinesia present in R hand  SKIN: No rashes or lesions      ADMISSION SUMMARY  Patient is a 23y old Female with 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, bipolar disorder, and anxiety who presents with a chief complaint of Righty sided weakness (16 Jul 2019 11:47)  Currently admitted to medicine with the primary diagnosis of Weakness of right upper extremity      ASSESSMENT & PLAN  #Acute right-sided weakness likely due to PML  - MRI findings suggestive of PML  - Toxoplasmosis IgG and IgM negative  - CrAg serum negative  - Neurology consult appreciated  - LP performed; will follow  - Treatment of PML is to continue ART    #Symptomatic AIDS with most recent CD4 count of 16  - VL  - Check RPR  - Attending is attempting to obtain baseline from PCP (Dr. Garcia)  - On Descovy, prezcobix, DTG (Will discuss with PCP changing to another Integrase inhibitor as she is of child bearing age and increased risk of neural tube defects). Can change regimen to Biktarvy/Prezcobix. Will discuss with Dr. Garcia  - Continue with dapsone 100mg PO QD for prophylaxis  - Recent studies show no benefit for MAC prophylaxis; will not administer azithromycin  - Will need outpatient genotype to check for HIV resistance    #Pancytopenia  - Attending is attempting to obtain baseline from PCP  - Follow peripheral smear  - Follow iron studies, ferritin  - Alk phos is within normal limits  - Acid fast blood culture  - No febrile illness to suggest EBV or CMV  - Adverse reaction with hemolytic anemia and dapsone if patient has G6P deficiency, should not affect platelets    #Bipolar disorder  - Continue with lamotrigine 25mg PO QD    #Misc  - DVT Prophylaxis: Not indicated  - Diet: Regular  - GI Prophylaxis: Not indicated  - Activity: Out of bed with assistance  - IV Fluids: Encourage PO intake    Dispo:    ( ) Discussion with patient and/or family regarding goals of care  ( ) Discussed Case and Plan with Medical Attending, Name:

## 2019-07-16 NOTE — PROGRESS NOTE ADULT - ASSESSMENT
ASSESSMENT  24 yo F with vertical transmission AIDS, Bipolar disorder, anxiety, recent miscarriage, presenting with 2 days of sudden R-sided weakness    IMPRESSION/RECOMMENDATIONS  #Acute R-sided weakness: suspect PML, MRI Focal white matter signal abnormality within the left frontal lobe without contrast enhancement or significant mass effect.  The imaging features (DWI signal hyperintensity, involvement of subcortical U-fibers) suggest PML.  Possible minimal involvement of the right frontal lobe.  Toxo IgG/IgM neg, CrAg serum neg  - Appreciate Neuro consult  - Recommend LP: cell count, protein, glucose, G/S and culture, CSF PCR, JOCELYNN Virus, Cryptococcal Ag , VDRL  - Treatment of PML is ART    #Symptomatic AIDS CD4 16;4%  - VL  - Check RPR  - Follows with Dr. Garcia, left VM  - On Descovy, prezcobix, DTG (Will discuss with PCP changing to another Integrase inhibitor as she is of child bearing age and increased risk of neural tube defects). Can change regimen to Biktarvy/Prezcobix. Will discuss with Dr. 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

## 2019-07-17 LAB
ANION GAP SERPL CALC-SCNC: 13 MMOL/L — SIGNIFICANT CHANGE UP (ref 7–14)
BASOPHILS # BLD AUTO: 0 K/UL — SIGNIFICANT CHANGE UP (ref 0–0.2)
BASOPHILS NFR BLD AUTO: 0 % — SIGNIFICANT CHANGE UP (ref 0–1)
BLD GP AB SCN SERPL QL: SIGNIFICANT CHANGE UP
BUN SERPL-MCNC: 13 MG/DL — SIGNIFICANT CHANGE UP (ref 10–20)
CALCIUM SERPL-MCNC: 9 MG/DL — SIGNIFICANT CHANGE UP (ref 8.5–10.1)
CHLORIDE SERPL-SCNC: 104 MMOL/L — SIGNIFICANT CHANGE UP (ref 98–110)
CO2 SERPL-SCNC: 20 MMOL/L — SIGNIFICANT CHANGE UP (ref 17–32)
CREAT SERPL-MCNC: 0.8 MG/DL — SIGNIFICANT CHANGE UP (ref 0.7–1.5)
CRYPTOC AG CSF-ACNC: NEGATIVE — SIGNIFICANT CHANGE UP
CSF PCR RESULT: SIGNIFICANT CHANGE UP
EOSINOPHIL # BLD AUTO: 0.11 K/UL — SIGNIFICANT CHANGE UP (ref 0–0.7)
EOSINOPHIL NFR BLD AUTO: 3.9 % — SIGNIFICANT CHANGE UP (ref 0–8)
GLUCOSE SERPL-MCNC: 91 MG/DL — SIGNIFICANT CHANGE UP (ref 70–99)
GRAM STN FLD: SIGNIFICANT CHANGE UP
HCT VFR BLD CALC: 28.8 % — LOW (ref 37–47)
HGB BLD-MCNC: 9.4 G/DL — LOW (ref 12–16)
IMM GRANULOCYTES NFR BLD AUTO: 0.4 % — HIGH (ref 0.1–0.3)
LDH SERPL L TO P-CCNC: 19 U/L — SIGNIFICANT CHANGE UP
LYMPHOCYTES # BLD AUTO: 0.86 K/UL — LOW (ref 1.2–3.4)
LYMPHOCYTES # BLD AUTO: 30.5 % — SIGNIFICANT CHANGE UP (ref 20.5–51.1)
MAGNESIUM SERPL-MCNC: 2 MG/DL — SIGNIFICANT CHANGE UP (ref 1.8–2.4)
MCHC RBC-ENTMCNC: 27.8 PG — SIGNIFICANT CHANGE UP (ref 27–31)
MCHC RBC-ENTMCNC: 32.6 G/DL — SIGNIFICANT CHANGE UP (ref 32–37)
MCV RBC AUTO: 85.2 FL — SIGNIFICANT CHANGE UP (ref 81–99)
MONOCYTES # BLD AUTO: 0.34 K/UL — SIGNIFICANT CHANGE UP (ref 0.1–0.6)
MONOCYTES NFR BLD AUTO: 12.1 % — HIGH (ref 1.7–9.3)
NEUTROPHILS # BLD AUTO: 1.5 K/UL — SIGNIFICANT CHANGE UP (ref 1.4–6.5)
NEUTROPHILS NFR BLD AUTO: 53.1 % — SIGNIFICANT CHANGE UP (ref 42.2–75.2)
NRBC # BLD: 0 /100 WBCS — SIGNIFICANT CHANGE UP (ref 0–0)
PHOSPHATE SERPL-MCNC: 5.5 MG/DL — HIGH (ref 2.1–4.9)
PLATELET # BLD AUTO: 109 K/UL — LOW (ref 130–400)
POTASSIUM SERPL-MCNC: 3.9 MMOL/L — SIGNIFICANT CHANGE UP (ref 3.5–5)
POTASSIUM SERPL-SCNC: 3.9 MMOL/L — SIGNIFICANT CHANGE UP (ref 3.5–5)
PROT FLD-MCNC: <1 G/DL — SIGNIFICANT CHANGE UP
RBC # BLD: 3.38 M/UL — LOW (ref 4.2–5.4)
RBC # FLD: 12.7 % — SIGNIFICANT CHANGE UP (ref 11.5–14.5)
SODIUM SERPL-SCNC: 137 MMOL/L — SIGNIFICANT CHANGE UP (ref 135–146)
SPECIMEN SOURCE: SIGNIFICANT CHANGE UP
WBC # BLD: 2.82 K/UL — LOW (ref 4.8–10.8)
WBC # FLD AUTO: 2.82 K/UL — LOW (ref 4.8–10.8)

## 2019-07-17 PROCEDURE — 95816 EEG AWAKE AND DROWSY: CPT | Mod: 26

## 2019-07-17 RX ADMIN — Medication 1 DROP(S): at 06:32

## 2019-07-17 RX ADMIN — DARUNAVIR ETHANOLATE AND COBICISTAT 1 TABLET(S): 800; 150 TABLET, FILM COATED ORAL at 12:21

## 2019-07-17 RX ADMIN — LIDOCAINE 1 PATCH: 4 CREAM TOPICAL at 08:00

## 2019-07-17 RX ADMIN — EMTRICITABINE AND TENOFOVIR DISOPROXIL FUMARATE 1 TABLET(S): 200; 300 TABLET, FILM COATED ORAL at 12:22

## 2019-07-17 RX ADMIN — Medication 50 MILLIGRAM(S): at 06:32

## 2019-07-17 RX ADMIN — DAPSONE 100 MILLIGRAM(S): 100 TABLET ORAL at 12:21

## 2019-07-17 RX ADMIN — DOLUTEGRAVIR SODIUM 50 MILLIGRAM(S): 25 TABLET, FILM COATED ORAL at 12:21

## 2019-07-17 RX ADMIN — Medication 1 DROP(S): at 18:52

## 2019-07-17 RX ADMIN — Medication 50 MILLIGRAM(S): at 18:51

## 2019-07-17 RX ADMIN — LIDOCAINE 1 PATCH: 4 CREAM TOPICAL at 12:00

## 2019-07-17 RX ADMIN — LAMOTRIGINE 25 MILLIGRAM(S): 25 TABLET, ORALLY DISINTEGRATING ORAL at 21:44

## 2019-07-17 NOTE — OCCUPATIONAL THERAPY INITIAL EVALUATION ADULT - PLANNED THERAPY INTERVENTIONS, OT EVAL
IADL retraining/ADL retraining/bed mobility training/fine motor coordination training/balance training/parent/caregiver training.../transfer training

## 2019-07-17 NOTE — PROGRESS NOTE ADULT - ASSESSMENT
ASSESSMENT  22 yo F with vertical transmission AIDS, Bipolar disorder, anxiety, recent miscarriage, presenting with 2 days of sudden R-sided weakness    IMPRESSION/RECOMMENDATIONS  #Acute R-sided weakness: suspect PML, MRI Focal white matter signal abnormality within the left frontal lobe without contrast enhancement or significant mass effect.  The imaging features (DWI signal hyperintensity, involvement of subcortical U-fibers) suggest PML.  Possible minimal involvement of the right frontal lobe.  Toxo IgG/IgM neg, CrAg serum neg  - PT/OT consult   - Appreciate Neuro consult  - F/u final LP studies, JOCELYNN virus   - Treatment of PML is ART    #Symptomatic AIDS CD4 16;4%   - VL  - Check RPR  - Follows with Dr. 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

## 2019-07-17 NOTE — PROGRESS NOTE ADULT - SUBJECTIVE AND OBJECTIVE BOX
JESSICA ALVAREZ 23y Female  MRN#: [id number]   CODE STATUS: Full Code      SUBJECTIVE  Patient is a 23y old Female with 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, bipolar disorder, and anxiety who presents with a chief complaint of right sided weakness (17 Jul 2019 09:54)  Currently admitted to medicine with the primary diagnosis of Weakness of right upper extremity    Today is hospital day 4d, and this morning she is laying comfortably in bed and reports no overnight events.     OBJECTIVE  PAST MEDICAL & SURGICAL HISTORY  Anxiety  Bipolar disorder  HIV (human immunodeficiency virus infection)  No significant past surgical history    ALLERGIES:  Mushrooms (Pruritus)  No Known Drug Allergies    MEDICATIONS:  STANDING MEDICATIONS  artificial tears (preservative free) Ophthalmic Solution 1 Drop(s) Both EYES two times a day  chlorhexidine 4% Liquid 1 Application(s) Topical <User Schedule>  dapsone 100 milliGRAM(s) Oral daily  darunavir 800 mG/cobicstat 150 mG 1 Tablet(s) Oral daily  dolutegravir 50 milliGRAM(s) Oral daily  emtricitabine 200 mG/tenofovir alafenamide 25 mG (DESCOVY) Tablet 1 Tablet(s) Oral daily  hydrOXYzine hydrochloride 50 milliGRAM(s) Oral every 12 hours  lamoTRIgine 25 milliGRAM(s) Oral at bedtime    PRN MEDICATIONS  acetaminophen   Tablet .. 650 milliGRAM(s) Oral every 6 hours PRN  ibuprofen  Tablet. 400 milliGRAM(s) Oral every 8 hours PRN      VITAL SIGNS: Last 24 Hours  T(C): 36.9 (17 Jul 2019 12:21), Max: 36.9 (17 Jul 2019 12:21)  T(F): 98.4 (17 Jul 2019 12:21), Max: 98.4 (17 Jul 2019 12:21)  HR: 81 (17 Jul 2019 12:21) (77 - 86)  BP: 104/67 (17 Jul 2019 12:21) (89/53 - 104/67)  BP(mean): --  RR: 16 (17 Jul 2019 12:21) (16 - 17)  SpO2: --    LABS:                        9.4    2.82  )-----------( 109      ( 17 Jul 2019 06:51 )             28.8     07-17    137  |  104  |  13  ----------------------------<  91  3.9   |  20  |  0.8    Ca    9.0      17 Jul 2019 06:51  Phos  5.5     07-17  Mg     2.0     07-17      PT/INR - ( 16 Jul 2019 12:37 )   PT: 12.80 sec;   INR: 1.11 ratio         PTT - ( 16 Jul 2019 12:37 )  PTT:35.3 sec          Culture - CSF with Gram Stain (collected 16 Jul 2019 16:25)  Source: .CSF CSF  Gram Stain (17 Jul 2019 04:05):    polymorphonuclear leukocytes seen    No organisms seen    by cytocentrifuge          RADIOLOGY:  < from: MR Head w/wo IV Cont (07.14.19 @ 22:16) >  FINDINGS:    Ventricles and cortical sulci are symmetric and compatible with a slight   degree of cerebral volume loss for age.    There is focal white matter signal abnormality in the left frontal lobe   with involvement of the subcortical U fibers and DWI signal   hyperintensity. There is also involvement of the right frontal lobe on   series 6 image 20. There is no significant mass effect. There is no   evidence of associated enhancement.    There is no evidence of acute intracranial hemorrhage, extra-axial fluid   collection or midline shift. There is normal flow void present within   the major vascular structures.      There is no evidence for pathologic intracranial enhancement.    The orbits, sinuses, and mastoid complexes are unremarkable.     IMPRESSION:     Focal white matter signal abnormality within the left frontal lobe   without contrast enhancement or significant mass effect.  The imaging   features (DWI signal hyperintensity, involvement of subcortical U-fibers)   suggest PML.  Possible minimal involvement of the right frontal lobe.    < end of copied text >      PHYSICAL EXAM:    GENERAL: NAD, well-developed female, AAOx3  HEENT:  Atraumatic, Normocephalic. EOMI, PERRLA, conjunctiva and sclera clear, No JVD  PULMONARY: Clear to auscultation bilaterally; No wheeze  CARDIOVASCULAR: Regular rate and rhythm; No murmurs, rubs, or gallops  GASTROINTESTINAL: Soft, Nontender, Nondistended; Bowel sounds present  MUSCULOSKELETAL:  2+ Peripheral Pulses, No clubbing, cyanosis, or edema  NEUROLOGY: CN II-XII intact, 4/5 strength in R shoulder shrug, 5/5 muscle strength in L shoulder shrug, 4+/5 strength in L arm flexion, extension, and  strength; 4-/5 strength in L arm flexion, extension, and  strength; 5/5 muscle strength in LLE; 4/5 strength in RLE; gait unchecked; full sensation throughout body; dysmetria noted in R arm with finger-to-nose test and R leg with heel-to-shin test; DTR were 2+/4 b/l for C5, C7, and L4; Dysdiadokinesia present in R hand; no interval change on physical exam from yesterday  SKIN: No rashes or lesions      ADMISSION SUMMARY  Patient is a 23y old Female with 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, bipolar disorder, and anxiety who presents with a chief complaint of right sided weakness (17 Jul 2019 09:54)  Currently admitted to medicine with the primary diagnosis of Weakness of right upper extremity    ASSESSMENT & PLAN  #Acute right-sided weakness likely due to PML  - MRI findings suggestive of PML  - Toxoplasmosis IgG and IgM negative  - CrAg serum negative  - Neurology consult appreciated  - PT/OT evaluated patient, recommended outpatient therapy  - Treatment of PML is to continue ART  - Follow final LP studies and JOCELYNN virus    #Symptomatic AIDS with most recent CD4 count of 16  - VL  - Check RPR  - Attending is attempting to obtain baseline from PCP (Dr. Garcia)  - On Descovy, prezcobix, DTG (Will discuss with PCP changing to another Integrase inhibitor as she is of child bearing age and increased risk of neural tube defects). Can change regimen to Biktarvy/Prezcobix. Will discuss with Dr. Garcia  - Continue with dapsone 100mg PO QD for prophylaxis  - Recent studies show no benefit for MAC prophylaxis; will not administer azithromycin  - Will need outpatient genotype to check for HIV resistance    #Pancytopenia  - Attending is attempting to obtain baseline from PCP  - Follow peripheral smear  - Follow iron studies, ferritin  - Alk phos is within normal limits  - Acid fast blood culture  - No febrile illness to suggest EBV or CMV  - Adverse reaction with hemolytic anemia and dapsone if patient has G6P deficiency, should not affect platelets    #Bipolar disorder  - Continue with lamotrigine 25mg PO QD    #Misc  - DVT Prophylaxis: Not indicated  - Diet: Regular  - GI Prophylaxis: Not indicated  - Activity: Out of bed with assistance  - IV Fluids: Encourage PO intake    Dispo: Anticipate for discharge tomorrow    ( ) Discussion with patient and/or family regarding goals of care  ( ) Discussed Case and Plan with Medical Attending, Name:

## 2019-07-17 NOTE — PROGRESS NOTE ADULT - SUBJECTIVE AND OBJECTIVE BOX
JESSICA ALVAREZ  23y, Female  Allergy: Mushrooms (Pruritus)  No Known Drug Allergies      CHIEF COMPLAINT: Right sided weakness (16 Jul 2019 18:53)      INTERVAL EVENTS/HPI  - No acute events overnight, s/p LP unremarkable , pending further studies  - T(F): , Max: 98.2 (07-16-19 @ 14:24)  - Denies any worsening symptoms  - Tolerating medication  - WBC Count: 2.82 K/uL (07-17-19 @ 06:51)      ROS  General: Denies rigors, nightsweats  HEENT: Denies headache, rhinorrhea, sore throat, eye pain  CV: Denies CP, palpitations  PULM: Denies SOB, wheezing  GI: Denies abdominal pain, hematochezia/melena  : Denies dysuria, hematuria  MSK: Denies arthralgias, myalgias  SKIN: Denies rash, lesions  NEURO: as noted above   PSYCH: Denies depression, anxiety    VITALS:  T(F): 97.6, Max: 98.2 (07-16-19 @ 14:24)  HR: 77  BP: 89/53  RR: 17Vital Signs Last 24 Hrs  T(C): 36.4 (17 Jul 2019 06:08), Max: 36.8 (16 Jul 2019 14:24)  T(F): 97.6 (17 Jul 2019 06:08), Max: 98.2 (16 Jul 2019 14:24)  HR: 77 (17 Jul 2019 06:08) (71 - 86)  BP: 89/53 (17 Jul 2019 06:08) (89/53 - 101/66)  BP(mean): --  RR: 17 (17 Jul 2019 06:08) (16 - 17)  SpO2: --    PHYSICAL EXAM:  Gen: NAD, resting in bed  HEENT: Normocephalic, atraumatic, pupils dilated and reactive, equal  Neck: supple, no lymphadenopathy  CV: Regular rate & regular rhythm  Lungs: CTAB  Abdomen: Soft, BS present  Ext: Warm, well perfused  Neuro: RUE and RLE 4/5, hyperreflexia, dysmetria   Skin: no rash, no erythema  Lines: no phlebitis      FH: Non-contributory  Social Hx: Non-contributory    TESTS & MEASUREMENTS:                        9.4    2.82  )-----------( 109      ( 17 Jul 2019 06:51 )             28.8     07-17    137  |  104  |  13  ----------------------------<  91  3.9   |  20  |  0.8    Ca    9.0      17 Jul 2019 06:51  Phos  5.5     07-17  Mg     2.0     07-17      eGFR if Non African American: 105 mL/min/1.73M2 (07-17-19 @ 06:51)  eGFR if African American: 122 mL/min/1.73M2 (07-17-19 @ 06:51)          Culture - CSF with Gram Stain (collected 07-16-19 @ 16:25)  Source: .CSF CSF  Gram Stain (07-17-19 @ 04:05):    polymorphonuclear leukocytes seen    No organisms seen    by cytocentrifuge    Culture - Blood (collected 07-14-19 @ 12:48)  Source: .Blood Blood  Preliminary Report (07-15-19 @ 21:01):    No growth to date.            INFECTIOUS DISEASES TESTING      RADIOLOGY & ADDITIONAL TESTS:  I have personally reviewed the last Chest xray  CXR      CT      CARDIOLOGY TESTING  Transthoracic Echocardiogram:    EXAM:  2-D ECHO (TTE) COMPLETE        PROCEDURE DATE:  07/15/2019      INTERPRETATION:  REPORT:    TRANSTHORACIC ECHOCARDIOGRAM REPORT         Patient Name:   JESSICA ALVAREZ Accession #: [id number]  Medical Rec #:  [id number]      Height:      61.0 in 154.9 cm  YOB: 1995      Weight:      120.0 lb 54.43 kg  Patient Age:    23 years       BSA:         1.52 m²  Patient Gender: F              BP:          104/62 mmHg       Date of Exam:        7/15/2019 1:02:45 PM  Referring Physician: [id number] ED UNASSIGNED  Sonographer:         Annamarie Stuart  Reading Physician:   Gregory Wright MD.    Procedure:   2D Echo/Doppler/Color Doppler Complete and Saline Contrast   (Bubble               Study).  Indications: R07.9 - Chest Pain, unspecified  Diagnosis:   Chest pain, unspecified - R07.9         Summary:   1. LV Ejection Fraction by Saldivar's Method with a biplane EF of 57 %.   2. Normal left ventricular size and wall thicknesses, with normal   systolic and diastolic function.   3. Color flow doppler and intravenous injection of agitated saline   demonstrates the presence of an intact intra atrial septum.   4. LA volume Index is 28.1 ml/m² ml/m2.    PHYSICIAN INTERPRETATION:  Left Ventricle: Normal left ventricular size and wall thicknesses, with   normal systolic and diastolic function.  Right Ventricle: Normal right ventricular size and function.  Left Atrium: Normal left atrial size. Color flow doppler and intravenous   injection of agitated saline demonstrates the presence of an intact intra   atrial septum. LA volume Index is 28.1 ml/m² ml/m2.  Right Atrium: Normal right atrial size.  Pericardium: There is no evidence of pericardial effusion.  Mitral Valve: Structurally normal mitral valve, with normal leaflet   excursion. The mitral valve is normal in structure. No evidence of mitral   valve regurgitation is seen.  Tricuspid Valve: Structurally normal tricuspid valve, with normal leaflet   excursion. The tricuspid valve is normal in structure. Mild tricuspid   regurgitation is visualized.  Aortic Valve: Normal trileaflet aortic valve with normal opening. The   aortic valve is normal. No evidence of aortic valve regurgitation is seen.  Pulmonic Valve: Structurally normal pulmonic valve, with normal leaflet  excursion. The pulmonic valve is normal. No indication of pulmonic valve   regurgitation.  Aorta: The aortic root and ascending aorta are structurally normal, with   no evidence of dilitation.  Pulmonary Artery: The main pulmonary artery is normal in size.  Venous: The inferior vena cava was normal sized, with respiratory size   variation greater than 50%.  Shunts: Agitated saline contrast was given intravenously to evaluate for   intracardiac shunting. There is no evidence of a patent foramen ovale.   There is no evidence of any atrial septal defect.       2D AND M-MODE MEASUREMENTS (normal ranges within parentheses):  Left                  Normal   Aorta/Left             Normal  Ventricle:                     Atrium:  IVSd (2D):  0.77 cm(0.7-1.1) AoV Cusp       1.95  (1.5-2.6)  LVPWd       0.74 cm  (0.7-1.1) Separation:     cm  (2D):                          Left Atrium    2.85  (1.9-4.0)  LVIDd       4.18 cm  (3.4-5.7) (Mmode):        cm  (2D):                          LA Volume  28.1  LVIDs       2.89 cm            Index         ml/m²  (2D):                          Right  LV FS       30.8 %    (>25%)   Ventricle:  (2D):                          RVd (2D):      2.62 cm  IVSd        0.64 cm  (0.7-1.1) TAPSE:         2.10 cm  (Mmode):  LVPWd       0.62 cm  (0.7-1.1)  (Mmode):  LVIDd       4.65 cm  (3.4-5.7)  (Mmode):  LVIDs       3.11 cm  (Mmode):  LV FS       33.0 %    (>25%)  (Mmode):  Relative     0.36     (<0.42)  Wall  Thickness  Rel. Wall    0.27     (<0.42)  Thickness  Mm  LV Mass    58.7 g/m²  Index:  Mmode    SPECTRAL DOPPLER ANALYSIS:  LV DIASTOLIC FUNCTION:  MV Peak E: 0.73 m/s Decel Time: 232 msec  MV Peak A: 0.42 m/s  E/A Ratio: 1.72    Aortic Valve:  AoV VMax:    1.26 m/s AoV Area, Vmax: 1.80 cm² Vmax Indx: 1.18 cm²/m²  AoV Pk Grad: 6.3 mmHg    LVOT Vmax: 0.97 m/s  LVOT VTI:  0.21 m  LVOT Diam: 1.72 cm    Mitral Valve:  MV P1/2 Time: 67.14 msec  MV Area, PHT: 3.28 cm²    Tricuspid Valve and PA/RV Systolic Pressure: TR Max Velocity: 2.33 m/s RA   Pressure: 3 mmHg RVSP/PASP: 24.8 mmHg       [id number] Gregory Wright MD, Electronically signed on 7/15/2019 at 1:50:28   PM              *** Final ***                    GREGORY WRIGHT MD  This document has been electronically signed. Jul 15 2019  1:02PM             (07-15-19 @ 13:02)      MEDICATIONS  artificial tears (preservative free) Ophthalmic Solution 1  chlorhexidine 4% Liquid 1  dapsone 100  darunavir 800 mG/cobicstat 150 mG 1  dolutegravir 50  emtricitabine 200 mG/tenofovir alafenamide 25 mG (DESCOVY) Tablet 1  hydrOXYzine hydrochloride 50  lamoTRIgine 25      ANTIBIOTICS:  dapsone 100 milliGRAM(s) Oral daily  darunavir 800 mG/cobicstat 150 mG 1 Tablet(s) Oral daily  dolutegravir 50 milliGRAM(s) Oral daily  emtricitabine 200 mG/tenofovir alafenamide 25 mG (DESCOVY) Tablet 1 Tablet(s) Oral daily

## 2019-07-18 ENCOUNTER — TRANSCRIPTION ENCOUNTER (OUTPATIENT)
Age: 24
End: 2019-07-18

## 2019-07-18 LAB
ANION GAP SERPL CALC-SCNC: 12 MMOL/L — SIGNIFICANT CHANGE UP (ref 7–14)
BASOPHILS # BLD AUTO: 0 K/UL — SIGNIFICANT CHANGE UP (ref 0–0.2)
BASOPHILS NFR BLD AUTO: 0 % — SIGNIFICANT CHANGE UP (ref 0–1)
BUN SERPL-MCNC: 13 MG/DL — SIGNIFICANT CHANGE UP (ref 10–20)
CALCIUM SERPL-MCNC: 9.1 MG/DL — SIGNIFICANT CHANGE UP (ref 8.5–10.1)
CHLORIDE SERPL-SCNC: 106 MMOL/L — SIGNIFICANT CHANGE UP (ref 98–110)
CO2 SERPL-SCNC: 22 MMOL/L — SIGNIFICANT CHANGE UP (ref 17–32)
CREAT SERPL-MCNC: 0.9 MG/DL — SIGNIFICANT CHANGE UP (ref 0.7–1.5)
EOSINOPHIL # BLD AUTO: 0.11 K/UL — SIGNIFICANT CHANGE UP (ref 0–0.7)
EOSINOPHIL NFR BLD AUTO: 3.3 % — SIGNIFICANT CHANGE UP (ref 0–8)
GLUCOSE SERPL-MCNC: 90 MG/DL — SIGNIFICANT CHANGE UP (ref 70–99)
HCT VFR BLD CALC: 27.1 % — LOW (ref 37–47)
HGB BLD-MCNC: 8.7 G/DL — LOW (ref 12–16)
IMM GRANULOCYTES NFR BLD AUTO: 0 % — LOW (ref 0.1–0.3)
JCPYV DNA # CSF NAA+PROBE: 600 COPIES/ML — HIGH
LYMPHOCYTES # BLD AUTO: 0.94 K/UL — LOW (ref 1.2–3.4)
LYMPHOCYTES # BLD AUTO: 27.8 % — SIGNIFICANT CHANGE UP (ref 20.5–51.1)
MAGNESIUM SERPL-MCNC: 2 MG/DL — SIGNIFICANT CHANGE UP (ref 1.8–2.4)
MCHC RBC-ENTMCNC: 27.8 PG — SIGNIFICANT CHANGE UP (ref 27–31)
MCHC RBC-ENTMCNC: 32.1 G/DL — SIGNIFICANT CHANGE UP (ref 32–37)
MCV RBC AUTO: 86.6 FL — SIGNIFICANT CHANGE UP (ref 81–99)
MONOCYTES # BLD AUTO: 0.4 K/UL — SIGNIFICANT CHANGE UP (ref 0.1–0.6)
MONOCYTES NFR BLD AUTO: 11.8 % — HIGH (ref 1.7–9.3)
NEUTROPHILS # BLD AUTO: 1.93 K/UL — SIGNIFICANT CHANGE UP (ref 1.4–6.5)
NEUTROPHILS NFR BLD AUTO: 57.1 % — SIGNIFICANT CHANGE UP (ref 42.2–75.2)
NRBC # BLD: 0 /100 WBCS — SIGNIFICANT CHANGE UP (ref 0–0)
PHOSPHATE SERPL-MCNC: 5.6 MG/DL — HIGH (ref 2.1–4.9)
PLATELET # BLD AUTO: 117 K/UL — LOW (ref 130–400)
POTASSIUM SERPL-MCNC: 4.1 MMOL/L — SIGNIFICANT CHANGE UP (ref 3.5–5)
POTASSIUM SERPL-SCNC: 4.1 MMOL/L — SIGNIFICANT CHANGE UP (ref 3.5–5)
RBC # BLD: 3.13 M/UL — LOW (ref 4.2–5.4)
RBC # FLD: 12.6 % — SIGNIFICANT CHANGE UP (ref 11.5–14.5)
SODIUM SERPL-SCNC: 140 MMOL/L — SIGNIFICANT CHANGE UP (ref 135–146)
T PALLIDUM AB TITR SER: NEGATIVE — SIGNIFICANT CHANGE UP
VDRL CSF-TITR: NEGATIVE — SIGNIFICANT CHANGE UP
WBC # BLD: 3.38 K/UL — LOW (ref 4.8–10.8)
WBC # FLD AUTO: 3.38 K/UL — LOW (ref 4.8–10.8)

## 2019-07-18 RX ORDER — DOLUTEGRAVIR SODIUM 25 MG/1
1 TABLET, FILM COATED ORAL
Qty: 0 | Refills: 0 | DISCHARGE

## 2019-07-18 RX ORDER — ELVITEGRAVIR, COBICISTAT, EMTRICITABINE, AND TENOFOVIR ALAFENAMIDE 150; 150; 200; 10 MG/1; MG/1; MG/1; MG/1
1 TABLET ORAL DAILY
Refills: 0 | Status: DISCONTINUED | OUTPATIENT
Start: 2019-07-18 | End: 2019-07-19

## 2019-07-18 RX ORDER — LIDOCAINE 4 G/100G
1 CREAM TOPICAL ONCE
Refills: 0 | Status: COMPLETED | OUTPATIENT
Start: 2019-07-18 | End: 2019-07-18

## 2019-07-18 RX ORDER — EMTRICITABINE AND TENOFOVIR DISOPROXIL FUMARATE 200; 300 MG/1; MG/1
1 TABLET, FILM COATED ORAL
Qty: 0 | Refills: 0 | DISCHARGE

## 2019-07-18 RX ORDER — ELVITEGRAVIR, COBICISTAT, EMTRICITABINE, AND TENOFOVIR ALAFENAMIDE 150; 150; 200; 10 MG/1; MG/1; MG/1; MG/1
1 TABLET ORAL
Qty: 30 | Refills: 0
Start: 2019-07-18 | End: 2019-08-16

## 2019-07-18 RX ORDER — ONDANSETRON 8 MG/1
1 TABLET, FILM COATED ORAL
Qty: 12 | Refills: 0
Start: 2019-07-18 | End: 2019-07-20

## 2019-07-18 RX ORDER — DARUNAVIR ETHANOLATE AND COBICISTAT 800; 150 MG/1; MG/1
1 TABLET, FILM COATED ORAL
Qty: 0 | Refills: 0 | DISCHARGE

## 2019-07-18 RX ORDER — DARUNAVIR 75 MG/1
800 TABLET, FILM COATED ORAL DAILY
Refills: 0 | Status: DISCONTINUED | OUTPATIENT
Start: 2019-07-18 | End: 2019-07-19

## 2019-07-18 RX ORDER — DARUNAVIR 75 MG/1
1 TABLET, FILM COATED ORAL
Qty: 30 | Refills: 0
Start: 2019-07-18 | End: 2019-08-16

## 2019-07-18 RX ADMIN — DARUNAVIR ETHANOLATE AND COBICISTAT 1 TABLET(S): 800; 150 TABLET, FILM COATED ORAL at 12:18

## 2019-07-18 RX ADMIN — LAMOTRIGINE 25 MILLIGRAM(S): 25 TABLET, ORALLY DISINTEGRATING ORAL at 21:19

## 2019-07-18 RX ADMIN — Medication 50 MILLIGRAM(S): at 18:17

## 2019-07-18 RX ADMIN — Medication 50 MILLIGRAM(S): at 06:11

## 2019-07-18 RX ADMIN — LIDOCAINE 1 PATCH: 4 CREAM TOPICAL at 12:17

## 2019-07-18 RX ADMIN — Medication 1 DROP(S): at 06:11

## 2019-07-18 RX ADMIN — DAPSONE 100 MILLIGRAM(S): 100 TABLET ORAL at 12:18

## 2019-07-18 RX ADMIN — EMTRICITABINE AND TENOFOVIR DISOPROXIL FUMARATE 1 TABLET(S): 200; 300 TABLET, FILM COATED ORAL at 12:19

## 2019-07-18 RX ADMIN — LIDOCAINE 1 PATCH: 4 CREAM TOPICAL at 19:00

## 2019-07-18 RX ADMIN — DOLUTEGRAVIR SODIUM 50 MILLIGRAM(S): 25 TABLET, FILM COATED ORAL at 12:18

## 2019-07-18 RX ADMIN — CHLORHEXIDINE GLUCONATE 1 APPLICATION(S): 213 SOLUTION TOPICAL at 06:11

## 2019-07-18 RX ADMIN — Medication 1 DROP(S): at 18:17

## 2019-07-18 NOTE — PROGRESS NOTE ADULT - ASSESSMENT
Impression:  24 y/o woman w/ hx AIDS presenting with RUE, RLE weakness. MRI brain (+) for lesions consistent w/ PML    Plan:  PT/OT  continue care per ID  f/u JOCELYNN virus PCR

## 2019-07-18 NOTE — PROGRESS NOTE ADULT - SUBJECTIVE AND OBJECTIVE BOX
HPI:  a 22 yo woman with vertical transmission HIV on HAART and bipolar / anxiety presented for 1 week history of right sided weakness. she states that her right upper ext doesn't feel right, she can hold things without falling but she feels weak, even during brushing her teeth she cannot do it as usual, she feels like the weakness more exacerbated at the shoulder joint. her right leg is also weak, she has a limp to the right, mainly at the shoulder but no fall. she denies slurred speech, H/A, blurry vision, diplopia, fever, chills, sick contacts, recent travel, pets at home or contacts with animals.  she is following now with dr Garcia and last CD4 count was in march, as per the patient it was low 37, and dr Garcia did not get back to her what to do.    Neurology Follow-up:  Pt seen at bedside without new complaint.    Vital Signs Last 24 Hrs  T(C): 36.8 (18 Jul 2019 14:21), Max: 36.8 (18 Jul 2019 14:21)  T(F): 98.3 (18 Jul 2019 14:21), Max: 98.3 (18 Jul 2019 14:21)  HR: 100 (18 Jul 2019 14:21) (92 - 100)  BP: 109/67 (18 Jul 2019 14:21) (101/65 - 109/67)  BP(mean): --  RR: 18 (18 Jul 2019 14:21) (17 - 18)  SpO2: --    Neuro Exam:  Orientation: oriented to person, oriented to place and oriented to time.   Language: no difficulty naming common objects, no difficulty repeating a phrase  Cranial Nerves: visual acuity intact bilaterally, visual fields full to confrontation, pupils equal round and reactive to light, extraocular motion intact, facial sensation intact symmetrically, face symmetrical, hearing was intact bilaterally, tongue and palate midline, head turning and shoulder shrug symmetric and there was no tongue deviation with protrusion.   Motor: strength normal except 4/5 LUE/RLE  Sensory exam: light touch was intact.   Coordination:. normal gait. balance was intact. there was no past-pointing. no tremor present.   Deep tendon reflexes:   Biceps right 2+. Biceps left 2+.    Triceps right 2+. Triceps left 2+.    Brachioradialis right 2+. Brachioradialis left 2+.    Patella right 2+. Patella left 2+.    Ankle jerk right 2+. Ankle jerk left 2+.   Plantar responses normal on the right, normal on the left.      Allergies    Mushrooms (Pruritus)  No Known Drug Allergies    Intolerances      MEDICATIONS  (STANDING):  artificial tears (preservative free) Ophthalmic Solution 1 Drop(s) Both EYES two times a day  chlorhexidine 4% Liquid 1 Application(s) Topical <User Schedule>  dapsone 100 milliGRAM(s) Oral daily  darunavir 800 mG/cobicstat 150 mG 1 Tablet(s) Oral daily  dolutegravir 50 milliGRAM(s) Oral daily  emtricitabine 200 mG/tenofovir alafenamide 25 mG (DESCOVY) Tablet 1 Tablet(s) Oral daily  hydrOXYzine hydrochloride 50 milliGRAM(s) Oral every 12 hours  lamoTRIgine 25 milliGRAM(s) Oral at bedtime    MEDICATIONS  (PRN):  acetaminophen   Tablet .. 650 milliGRAM(s) Oral every 6 hours PRN Temp greater or equal to 38C (100.4F), Mild Pain (1 - 3), Moderate Pain (4 - 6)  ibuprofen  Tablet. 400 milliGRAM(s) Oral every 8 hours PRN Moderate Pain (4 - 6), Severe Pain (7 - 10)      LABS:                        8.7    3.38  )-----------( 117      ( 18 Jul 2019 06:12 )             27.1     07-18    140  |  106  |  13  ----------------------------<  90  4.1   |  22  |  0.9    Ca    9.1      18 Jul 2019 06:12  Phos  5.6     07-18  Mg     2.0     07-18    TPro  x   /  Alb  3978  /  TBili  x   /  DBili  x   /  AST  x   /  ALT  x   /  AlkPhos  x   07-16    Cerebrospinal Fluid Cell Count-1 (07.16.19 @ 16:00)    Total Nucleated Cell Count, CSF: 1 /uL    RBC Count - Spinal Fluid: 950 /uL    CSF Color: Colorless    Tube Type: Tube 1    CSF Appearance: Slightly Cloudy    CSF Lymphocytes: 1: ONLY ONE LYMPH SEEN %    CSF Segmented Neutrophils: N/A %    Appearance Spun: Colorless    CSF PCR Result: NotDetec    < from: MR Head w/wo IV Cont (07.14.19 @ 22:16) >    Ventricles and cortical sulci are symmetric and compatible with a slight   degree of cerebral volume loss for age.    There is focal white matter signal abnormality in the left frontal lobe   with involvement of the subcortical U fibers and DWI signal   hyperintensity. There is also involvement of the right frontal lobe on   series 6 image 20. There is no significant mass effect. There is no   evidence of associated enhancement.    There is no evidence of acute intracranial hemorrhage, extra-axial fluid   collection or midline shift. There is normal flow void present within   the major vascular structures.      There is no evidence for pathologic intracranial enhancement.    The orbits, sinuses, and mastoid complexes are unremarkable.     IMPRESSION:     Focal white matter signal abnormality within the left frontal lobe   without contrast enhancement or significant mass effect.  The imaging   features (DWI signal hyperintensity, involvement of subcortical U-fibers)   suggest PML.  Possible minimal involvement of the right frontal lobe.    < end of copied text >    < from: MR Angio Head No Cont (07.14.19 @ 21:54) >    The visualized distal segments of the internal carotid arteries are   patent. There is normal bifurcation into the A1 and M1 segments.    The visualized distal vertebral arteries and the basilar artery are   patent. There is normal bifurcation into the P1 segments.      IMPRESSION:      Unremarkable MRA of the brain.    < end of copied text >

## 2019-07-18 NOTE — DISCHARGE NOTE PROVIDER - NSDCFUADDINST_GEN_ALL_CORE_FT
Please follow up with your primary care provider in one week. Please start your new HAART medications: darunavir and Genvoya. Please take your medications every day as prescribed.

## 2019-07-18 NOTE — PROGRESS NOTE ADULT - ASSESSMENT
ASSESSMENT  22 yo F with vertical transmission AIDS, Bipolar disorder, anxiety, recent miscarriage, presenting with 2 days of sudden R-sided weakness    IMPRESSION/RECOMMENDATIONS  #Acute R-sided weakness: suspect PML, MRI Focal white matter signal abnormality within the left frontal lobe without contrast enhancement or significant mass effect.  The imaging features (DWI signal hyperintensity, involvement of subcortical U-fibers) suggest PML.  Possible minimal involvement of the right frontal lobe.  Toxo IgG/IgM neg, CrAg serum neg  - PT/OT consult appreciated  - Appreciate Neuro consult: significance of IgG elevated in CSF?? can be associated with MS, unclear if can be elevated 2/2 PML  - F/u final LP studies, JOCELYNN virus   - Treatment of PML is ART    #Symptomatic AIDS CD4 16;4%   - VL  - Follows with Dr. Mccarthy, discussed case. Will follows with her  - On Descovy, prezcobix, DTG- Try to change to SYMTUZA +RAL 400mg BID as of child bearing age, recent miscarriage and DTG associated with birth defects  - Counseled about safe sex  - PPX Dapsone   - Recent studies show no role for MAC PPX  - Will need an outpatient Genotype to check for HIV resistance. 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  - Obtain baseline from PCP  - Peripheral smear  - Alk Ph is wnl, thus low suspicion for MAC or bone marrow occupying infection--> Acid Fast blood culture   - No febrile illness to suggest EBV or CMV  - Adverse rxn with hemolytic anemia and dapsone if G6PD, should not affect platelets    #Bipolar  - Lamictal 25mg PO    Spectra [id number] ASSESSMENT  22 yo F with vertical transmission AIDS, Bipolar disorder, anxiety, recent miscarriage, presenting with 2 days of sudden R-sided weakness    IMPRESSION/RECOMMENDATIONS  #Acute R-sided weakness: suspect PML, MRI Focal white matter signal abnormality within the left frontal lobe without contrast enhancement or significant mass effect.  The imaging features (DWI signal hyperintensity, involvement of subcortical U-fibers) suggest PML.  Possible minimal involvement of the right frontal lobe.  Toxo IgG/IgM neg, CrAg serum neg  - PT/OT consult appreciated  - Appreciate Neuro consult: significance of IgG elevated in CSF?? can be associated with MS, unclear if can be elevated 2/2 PML  - F/u final LP studies, JOCELYNN virus   - Treatment of PML is ART    #Symptomatic AIDS CD4 16;4%   - VL  - Follows with Dr. Mccarthy, discussed case. Will follows with her  - On Descovy, prezcobix, DTG- Try to change to Genvoya + Darunavir 800mg to decrease pill burden associated with birth defects. Pharmacy did not carry (symtuza +RAL as another option)  - Requesting script for zofran PRN  - Counseled about safe sex  - PPX Dapsone   - Recent studies show no role for MAC PPX  - Will need an outpatient Genotype to check for HIV resistance. 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

## 2019-07-18 NOTE — DISCHARGE NOTE NURSING/CASE MANAGEMENT/SOCIAL WORK - NSDCDPATPORTLINK_GEN_ALL_CORE
You can access the [facility], offered by [facility], by registering with the following website: [email or web address]

## 2019-07-18 NOTE — PROGRESS NOTE ADULT - SUBJECTIVE AND OBJECTIVE BOX
JESSICA ALVAREZ  23y, Female  Allergy: Mushrooms (Pruritus)  No Known Drug Allergies      CHIEF COMPLAINT: Righty sided weakness (18 Jul 2019 14:25)      INTERVAL EVENTS/HPI  - No acute events overnight  - T(F): , Max: 98.3 (07-18-19 @ 14:21)  - Denies any worsening symptoms  - Tolerating medication  - WBC Count: 3.38 K/uL (07-18-19 @ 06:12)      ROS  General: Denies rigors, nightsweats  HEENT: Denies headache, rhinorrhea, sore throat, eye pain  CV: Denies CP, palpitations  PULM: Denies SOB, wheezing  GI: Denies abdominal pain, hematochezia/melena  : Denies dysuria, hematuria  MSK: Denies arthralgias, myalgias  SKIN: Denies rash, lesions  NEURO: Denies paresthesias, weakness  PSYCH: Denies depression, anxiety    VITALS:  T(F): 98.3, Max: 98.3 (07-18-19 @ 14:21)  HR: 100  BP: 109/67  RR: 18Vital Signs Last 24 Hrs  T(C): 36.8 (18 Jul 2019 14:21), Max: 36.8 (18 Jul 2019 14:21)  T(F): 98.3 (18 Jul 2019 14:21), Max: 98.3 (18 Jul 2019 14:21)  HR: 100 (18 Jul 2019 14:21) (92 - 100)  BP: 109/67 (18 Jul 2019 14:21) (101/65 - 109/67)  BP(mean): --  RR: 18 (18 Jul 2019 14:21) (17 - 18)  SpO2: --    PHYSICAL EXAM:  Gen: NAD, resting in bed  HEENT: Normocephalic, atraumatic, pupils dilated and reactive, equal  Neck: supple, no lymphadenopathy  CV: Regular rate & regular rhythm  Lungs: CTAB  Abdomen: Soft, BS present  Ext: Warm, well perfused  Neuro: RUE and RLE 4/5, hyperreflexia, dysmetria   Skin: no rash, no erythema  Lines: no phlebitis      FH: Non-contributory  Social Hx: Non-contributory    TESTS & MEASUREMENTS:                        8.7    3.38  )-----------( 117      ( 18 Jul 2019 06:12 )             27.1     07-18    140  |  106  |  13  ----------------------------<  90  4.1   |  22  |  0.9    Ca    9.1      18 Jul 2019 06:12  Phos  5.6     07-18  Mg     2.0     07-18    TPro  x   /  Alb  3978  /  TBili  x   /  DBili  x   /  AST  x   /  ALT  x   /  AlkPhos  x   07-16    eGFR if Non African American: 91 mL/min/1.73M2 (07-18-19 @ 06:12)  eGFR if African American: 106 mL/min/1.73M2 (07-18-19 @ 06:12)    LIVER FUNCTIONS - ( 16 Jul 2019 16:00 )  Alb: 3978 mg/dL / Pro: x     / ALK PHOS: x     / ALT: x     / AST: x     / GGT: x               Culture - Fungal, CSF (collected 07-16-19 @ 16:25)  Source: .CSF CSF  Preliminary Report (07-18-19 @ 08:56):    Testing in progress    Culture - CSF with Gram Stain (collected 07-16-19 @ 16:25)  Source: .CSF CSF  Gram Stain (07-17-19 @ 04:05):    polymorphonuclear leukocytes seen    No organisms seen    by cytocentrifuge  Preliminary Report (07-18-19 @ 07:38):    No growth    Culture - Blood (collected 07-14-19 @ 12:48)  Source: .Blood Blood  Preliminary Report (07-15-19 @ 21:01):    No growth to date.            INFECTIOUS DISEASES TESTING      RADIOLOGY & ADDITIONAL TESTS:  I have personally reviewed the last Chest xray  CXR      CT      CARDIOLOGY TESTING  Transthoracic Echocardiogram:    EXAM:  2-D ECHO (TTE) COMPLETE        PROCEDURE DATE:  07/15/2019      INTERPRETATION:  REPORT:    TRANSTHORACIC ECHOCARDIOGRAM REPORT         Patient Name:   JESSICA ALVAREZ Accession #: [id number]  Medical Rec #:  [id number]      Height:      61.0 in 154.9 cm  YOB: 1995      Weight:      120.0 lb 54.43 kg  Patient Age:    23 years       BSA:         1.52 m²  Patient Gender: F              BP:          104/62 mmHg       Date of Exam:        7/15/2019 1:02:45 PM  Referring Physician: [id number] ED UNASSIGNED  Sonographer:         Annamarie Salgado Physician:   Gregory Wright MD.    Procedure:   2D Echo/Doppler/Color Doppler Complete and Saline Contrast   (Bubble               Study).  Indications: R07.9 - Chest Pain, unspecified  Diagnosis:   Chest pain, unspecified - R07.9         Summary:   1. LV Ejection Fraction by Saldivar's Method with a biplane EF of 57 %.   2. Normal left ventricular size and wall thicknesses, with normal   systolic and diastolic function.   3. Color flow doppler and intravenous injection of agitated saline   demonstrates the presence of an intact intra atrial septum.   4. LA volume Index is 28.1 ml/m² ml/m2.    PHYSICIAN INTERPRETATION:  Left Ventricle: Normal left ventricular size and wall thicknesses, with   normal systolic and diastolic function.  Right Ventricle: Normal right ventricular size and function.  Left Atrium: Normal left atrial size. Color flow doppler and intravenous   injection of agitated saline demonstrates the presence of an intact intra   atrial septum. LA volume Index is 28.1 ml/m² ml/m2.  Right Atrium: Normal right atrial size.  Pericardium: There is no evidence of pericardial effusion.  Mitral Valve: Structurally normal mitral valve, with normal leaflet   excursion. The mitral valve is normal in structure. No evidence of mitral   valve regurgitation is seen.  Tricuspid Valve: Structurally normal tricuspid valve, with normal leaflet   excursion. The tricuspid valve is normal in structure. Mild tricuspid   regurgitation is visualized.  Aortic Valve: Normal trileaflet aortic valve with normal opening. The   aortic valve is normal. No evidence of aortic valve regurgitation is seen.  Pulmonic Valve: Structurally normal pulmonic valve, with normal leaflet  excursion. The pulmonic valve is normal. No indication of pulmonic valve   regurgitation.  Aorta: The aortic root and ascending aorta are structurally normal, with   no evidence of dilitation.  Pulmonary Artery: The main pulmonary artery is normal in size.  Venous: The inferior vena cava was normal sized, with respiratory size   variation greater than 50%.  Shunts: Agitated saline contrast was given intravenously to evaluate for   intracardiac shunting. There is no evidence of a patent foramen ovale.   There is no evidence of any atrial septal defect.       2D AND M-MODE MEASUREMENTS (normal ranges within parentheses):  Left                  Normal   Aorta/Left             Normal  Ventricle:                     Atrium:  IVSd (2D):  0.77 cm(0.7-1.1) AoV Cusp       1.95  (1.5-2.6)  LVPWd       0.74 cm  (0.7-1.1) Separation:     cm  (2D):                          Left Atrium    2.85  (1.9-4.0)  LVIDd       4.18 cm  (3.4-5.7) (Mmode):        cm  (2D):                          LA Volume  28.1  LVIDs       2.89 cm            Index         ml/m²  (2D):                          Right  LV FS       30.8 %    (>25%)   Ventricle:  (2D):                          RVd (2D):      2.62 cm  IVSd        0.64 cm  (0.7-1.1) TAPSE:         2.10 cm  (Mmode):  LVPWd       0.62 cm  (0.7-1.1)  (Mmode):  LVIDd       4.65 cm  (3.4-5.7)  (Mmode):  LVIDs       3.11 cm  (Mmode):  LV FS       33.0 %    (>25%)  (Mmode):  Relative     0.36     (<0.42)  Wall  Thickness  Rel. Wall    0.27     (<0.42)  Thickness  Mm  LV Mass    58.7 g/m²  Index:  Mmode    SPECTRAL DOPPLER ANALYSIS:  LV DIASTOLIC FUNCTION:  MV Peak E: 0.73 m/s Decel Time: 232 msec  MV Peak A: 0.42 m/s  E/A Ratio: 1.72    Aortic Valve:  AoV VMax:    1.26 m/s AoV Area, Vmax: 1.80 cm² Vmax Indx: 1.18 cm²/m²  AoV Pk Grad: 6.3 mmHg    LVOT Vmax: 0.97 m/s  LVOT VTI:  0.21 m  LVOT Diam: 1.72 cm    Mitral Valve:  MV P1/2 Time: 67.14 msec  MV Area, PHT: 3.28 cm²    Tricuspid Valve and PA/RV Systolic Pressure: TR Max Velocity: 2.33 m/s RA   Pressure: 3 mmHg RVSP/PASP: 24.8 mmHg       [id number] Gregory Wright MD, Electronically signed on 7/15/2019 at 1:50:28   PM              *** Final ***                    GREGORY WRIGHT MD  This document has been electronically signed. Jul 15 2019  1:02PM             (07-15-19 @ 13:02)      MEDICATIONS  artificial tears (preservative free) Ophthalmic Solution 1  chlorhexidine 4% Liquid 1  dapsone 100  darunavir 800 mG/cobicstat 150 mG 1  dolutegravir 50  emtricitabine 200 mG/tenofovir alafenamide 25 mG (DESCOVY) Tablet 1  hydrOXYzine hydrochloride 50  lamoTRIgine 25      ANTIBIOTICS:  dapsone 100 milliGRAM(s) Oral daily  darunavir 800 mG/cobicstat 150 mG 1 Tablet(s) Oral daily  dolutegravir 50 milliGRAM(s) Oral daily  emtricitabine 200 mG/tenofovir alafenamide 25 mG (DESCOVY) Tablet 1 Tablet(s) Oral daily

## 2019-07-18 NOTE — DISCHARGE NOTE PROVIDER - NSDCCPCAREPLAN_GEN_ALL_CORE_FT
PRINCIPAL DISCHARGE DIAGNOSIS  Diagnosis: Weakness of right upper extremity  Assessment and Plan of Treatment: You were brought to the hospital for right arm and leg weakness. You were evaluated by neurology and infectious disease. An MRI of your head showed signs suggesting progressive multifocal leukoencephalopathy (PML). You were started on a new HAART therapy to help with the disease. Please take your new medications as prescribed. Please follow up with Dr. Mccarthy within 1-2 weeks after being discharged.      SECONDARY DISCHARGE DIAGNOSES  Diagnosis: HIV (human immunodeficiency virus infection)  Assessment and Plan of Treatment: Please continue with your new HAART therapy.    Diagnosis: Weakness of right lower extremity  Assessment and Plan of Treatment: You were evaluated by occupational therapy, who recommended outpatient rehabilitation with occupational therapy.    Diagnosis: Weakness on right side of face  Assessment and Plan of Treatment:

## 2019-07-18 NOTE — DISCHARGE NOTE PROVIDER - HOSPITAL COURSE
Patient is a 24yo female with PMHx of vertical transmission HIV on HAART, bipolar disorder, and anxiety who presented to the hospital with a one-week history of right-sided weakness. NIHSS score was 2 on admission. MRI of the head showed findings suggestive of PML. Patient seen by neurology and infectious disease. CD4 count was 16. ID reevaluated HAART therapy and started patient on Genvoya and darunavir. Patien evaluated by occupational therapy for right arm weakness.        Patient is stable and ready for discharge. Patient will need outpatient occupational therapy.

## 2019-07-19 VITALS — HEART RATE: 99 BPM | DIASTOLIC BLOOD PRESSURE: 67 MMHG | SYSTOLIC BLOOD PRESSURE: 108 MMHG

## 2019-07-19 LAB
CULTURE RESULTS: SIGNIFICANT CHANGE UP
HIV-1 VIRAL LOAD RESULT: DETECTED
HIV1 RNA # SERPL NAA+PROBE: SIGNIFICANT CHANGE UP
HIV1 RNA SERPL NAA+PROBE-ACNC: DETECTED
HIV1 RNA SERPL NAA+PROBE-LOG#: 4.43 LG COP/ML — SIGNIFICANT CHANGE UP
SPECIMEN SOURCE: SIGNIFICANT CHANGE UP

## 2019-07-19 RX ORDER — ELVITEGRAVIR, COBICISTAT, EMTRICITABINE, AND TENOFOVIR ALAFENAMIDE 150; 150; 200; 10 MG/1; MG/1; MG/1; MG/1
1 TABLET ORAL
Qty: 30 | Refills: 0
Start: 2019-07-19 | End: 2019-08-17

## 2019-07-19 RX ORDER — DARUNAVIR 75 MG/1
1 TABLET, FILM COATED ORAL
Qty: 30 | Refills: 0
Start: 2019-07-19 | End: 2019-08-17

## 2019-07-19 RX ADMIN — DARUNAVIR 800 MILLIGRAM(S): 75 TABLET, FILM COATED ORAL at 11:42

## 2019-07-19 RX ADMIN — LIDOCAINE 1 PATCH: 4 CREAM TOPICAL at 00:17

## 2019-07-19 RX ADMIN — Medication 1 DROP(S): at 05:51

## 2019-07-19 RX ADMIN — Medication 50 MILLIGRAM(S): at 05:48

## 2019-07-19 RX ADMIN — Medication 1 DROP(S): at 17:20

## 2019-07-19 RX ADMIN — CHLORHEXIDINE GLUCONATE 1 APPLICATION(S): 213 SOLUTION TOPICAL at 05:48

## 2019-07-19 RX ADMIN — DAPSONE 100 MILLIGRAM(S): 100 TABLET ORAL at 11:42

## 2019-07-19 RX ADMIN — ELVITEGRAVIR, COBICISTAT, EMTRICITABINE, AND TENOFOVIR ALAFENAMIDE 1 TABLET(S): 150; 150; 200; 10 TABLET ORAL at 11:43

## 2019-07-19 RX ADMIN — Medication 50 MILLIGRAM(S): at 17:12

## 2019-07-19 NOTE — PROGRESS NOTE ADULT - ASSESSMENT
ASSESSMENT  24 yo F with vertical transmission AIDS, Bipolar disorder, anxiety, recent miscarriage, presenting with 2 days of sudden R-sided weakness    IMPRESSION/RECOMMENDATIONS  #PML, +CSF JOCELYNN virus. presenting with R-sided weakness and issues with coordination.   MRI Focal white matter signal abnormality within the left frontal lobe without contrast enhancement or significant mass effect.  The imaging features (DWI signal hyperintensity, involvement of subcortical U-fibers) suggest PML.  Possible minimal involvement of the right frontal lobe.  Toxo IgG/IgM neg, CrAg serum neg  - PT/OT consult appreciated  - Appreciate Neuro consult  - Treatment of PML is ART    #Symptomatic AIDS CD4 16;4%   - VL  - Follows with Dr. Mccarthy, discussed case. Will follows with her  - On Descovy, prezcobix, DTG- Try to change to Genvoya + Darunavir 800mg to decrease pill burden associated with birth defects. Pharmacy did not carry (symtuza +RAL as another option)  - Requesting script for zofran PRN  - Counseled about safe sex  - PPX Dapsone   - Recent studies show no role for MAC PPX  - Will need an outpatient Genotype to check for HIV resistance. 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

## 2019-07-19 NOTE — PROGRESS NOTE ADULT - NSHPATTENDINGPLANDISCUSS_GEN_ALL_CORE
Medicine floor resident

## 2019-07-19 NOTE — PROGRESS NOTE ADULT - PROVIDER SPECIALTY LIST ADULT
Infectious Disease
Internal Medicine
Neurology
Neurology
Internal Medicine
Infectious Disease
Infectious Disease

## 2019-07-19 NOTE — PROGRESS NOTE ADULT - SUBJECTIVE AND OBJECTIVE BOX
JESSICA ALVAREZ  23y, Female  Allergy: Mushrooms (Pruritus)  No Known Drug Allergies      CHIEF COMPLAINT: Right sided weakness (18 Jul 2019 15:20)      INTERVAL EVENTS/HPI  - No acute events overnight  - CSF +JOCELYNN virus  - T(F): , Max: 98.3 (07-18-19 @ 14:21)  - Denies any worsening symptoms  - Tolerating medication  -     ROS  General: Denies rigors, nightsweats  HEENT: Denies headache, rhinorrhea, sore throat, eye pain  CV: Denies CP, palpitations  PULM: Denies SOB, wheezing  GI: Denies abdominal pain, hematochezia/melena  : Denies dysuria, hematuria  MSK: Denies arthralgias, myalgias  SKIN: Denies rash, lesions  NEURO: Denies paresthesias, weakness  PSYCH: Denies depression, anxiety    VITALS:  T(F): 97.7, Max: 98.3 (07-18-19 @ 14:21)  HR: 83  BP: 96/56  RR: 18Vital Signs Last 24 Hrs  T(C): 36.5 (19 Jul 2019 04:52), Max: 36.8 (18 Jul 2019 14:21)  T(F): 97.7 (19 Jul 2019 04:52), Max: 98.3 (18 Jul 2019 14:21)  HR: 83 (19 Jul 2019 04:52) (83 - 104)  BP: 96/56 (19 Jul 2019 04:52) (96/56 - 109/67)  BP(mean): --  RR: 18 (19 Jul 2019 04:52) (18 - 18)  SpO2: 98% (19 Jul 2019 07:35) (98% - 98%)    PHYSICAL EXAM:  Gen: NAD, resting in bed  HEENT: Normocephalic, atraumatic, pupils dilated and reactive, equal  Neck: supple, no lymphadenopathy  CV: Regular rate & regular rhythm  Lungs: CTAB  Abdomen: Soft, BS present  Ext: Warm, well perfused  Neuro: RUE and RLE 4/5, hyperreflexia, dysmetria   Skin: no rash, no erythema  Lines: no phlebitis    FH: Non-contributory  Social Hx: Non-contributory    TESTS & MEASUREMENTS:                        8.7    3.38  )-----------( 117      ( 18 Jul 2019 06:12 )             27.1     07-18    140  |  106  |  13  ----------------------------<  90  4.1   |  22  |  0.9    Ca    9.1      18 Jul 2019 06:12  Phos  5.6     07-18  Mg     2.0     07-18              Culture - Acid Fast (collected 07-16-19 @ 16:25)    Culture - Fungal, CSF (collected 07-16-19 @ 16:25)  Source: .CSF CSF  Preliminary Report (07-18-19 @ 08:56):    Testing in progress    Culture - CSF with Gram Stain (collected 07-16-19 @ 16:25)  Source: .CSF CSF  Gram Stain (07-17-19 @ 04:05):    polymorphonuclear leukocytes seen    No organisms seen    by cytocentrifuge  Preliminary Report (07-18-19 @ 07:38):    No growth    Culture - Blood (collected 07-14-19 @ 12:48)  Source: .Blood Blood  Preliminary Report (07-15-19 @ 21:01):    No growth to date.            INFECTIOUS DISEASES TESTING      RADIOLOGY & ADDITIONAL TESTS:  I have personally reviewed the last Chest xray  CXR      CT      CARDIOLOGY TESTING  Transthoracic Echocardiogram:    EXAM:  2-D ECHO (TTE) COMPLETE        PROCEDURE DATE:  07/15/2019      INTERPRETATION:  REPORT:    TRANSTHORACIC ECHOCARDIOGRAM REPORT         Patient Name:   JESSICA ALVAREZ Accession #: [id number]  Medical Rec #:  [id number]      Height:      61.0 in 154.9 cm  YOB: 1995      Weight:      120.0 lb 54.43 kg  Patient Age:    23 years       BSA:         1.52 m²  Patient Gender: F              BP:          104/62 mmHg       Date of Exam:        7/15/2019 1:02:45 PM  Referring Physician: [id number] ED UNASSIGNED  Sonographer:         Annamarie Stuart  Reading Physician:   Jimi Wright MD.    Procedure:   2D Echo/Doppler/Color Doppler Complete and Saline Contrast   (Bubble               Study).  Indications: R07.9 - Chest Pain, unspecified  Diagnosis:   Chest pain, unspecified - R07.9         Summary:   1. LV Ejection Fraction by Saldivar's Method with a biplane EF of 57 %.   2. Normal left ventricular size and wall thicknesses, with normal   systolic and diastolic function.   3. Color flow doppler and intravenous injection of agitated saline   demonstrates the presence of an intact intra atrial septum.   4. LA volume Index is 28.1 ml/m² ml/m2.    PHYSICIAN INTERPRETATION:  Left Ventricle: Normal left ventricular size and wall thicknesses, with   normal systolic and diastolic function.  Right Ventricle: Normal right ventricular size and function.  Left Atrium: Normal left atrial size. Color flow doppler and intravenous   injection of agitated saline demonstrates the presence of an intact intra   atrial septum. LA volume Index is 28.1 ml/m² ml/m2.  Right Atrium: Normal right atrial size.  Pericardium: There is no evidence of pericardial effusion.  Mitral Valve: Structurally normal mitral valve, with normal leaflet   excursion. The mitral valve is normal in structure. No evidence of mitral   valve regurgitation is seen.  Tricuspid Valve: Structurally normal tricuspid valve, with normal leaflet   excursion. The tricuspid valve is normal in structure. Mild tricuspid   regurgitation is visualized.  Aortic Valve: Normal trileaflet aortic valve with normal opening. The   aortic valve is normal. No evidence of aortic valve regurgitation is seen.  Pulmonic Valve: Structurally normal pulmonic valve, with normal leaflet  excursion. The pulmonic valve is normal. No indication of pulmonic valve   regurgitation.  Aorta: The aortic root and ascending aorta are structurally normal, with   no evidence of dilitation.  Pulmonary Artery: The main pulmonary artery is normal in size.  Venous: The inferior vena cava was normal sized, with respiratory size   variation greater than 50%.  Shunts: Agitated saline contrast was given intravenously to evaluate for   intracardiac shunting. There is no evidence of a patent foramen ovale.   There is no evidence of any atrial septal defect.       2D AND M-MODE MEASUREMENTS (normal ranges within parentheses):  Left                  Normal   Aorta/Left             Normal  Ventricle:                     Atrium:  IVSd (2D):  0.77 cm(0.7-1.1) AoV Cusp       1.95  (1.5-2.6)  LVPWd       0.74 cm  (0.7-1.1) Separation:     cm  (2D):                          Left Atrium    2.85  (1.9-4.0)  LVIDd       4.18 cm  (3.4-5.7) (Mmode):        cm  (2D):                          LA Volume  28.1  LVIDs       2.89 cm            Index         ml/m²  (2D):                          Right  LV FS       30.8 %    (>25%)   Ventricle:  (2D):                          RVd (2D):      2.62 cm  IVSd        0.64 cm  (0.7-1.1) TAPSE:         2.10 cm  (Mmode):  LVPWd       0.62 cm  (0.7-1.1)  (Mmode):  LVIDd       4.65 cm  (3.4-5.7)  (Mmode):  LVIDs       3.11 cm  (Mmode):  LV FS       33.0 %    (>25%)  (Mmode):  Relative     0.36     (<0.42)  Wall  Thickness  Rel. Wall    0.27     (<0.42)  Thickness  Mm  LV Mass    58.7 g/m²  Index:  Mmode    SPECTRAL DOPPLER ANALYSIS:  LV DIASTOLIC FUNCTION:  MV Peak E: 0.73 m/s Decel Time: 232 msec  MV Peak A: 0.42 m/s  E/A Ratio: 1.72    Aortic Valve:  AoV VMax:    1.26 m/s AoV Area, Vmax: 1.80 cm² Vmax Indx: 1.18 cm²/m²  AoV Pk Grad: 6.3 mmHg    LVOT Vmax: 0.97 m/s  LVOT VTI:  0.21 m  LVOT Diam: 1.72 cm    Mitral Valve:  MV P1/2 Time: 67.14 msec  MV Area, PHT: 3.28 cm²    Tricuspid Valve and PA/RV Systolic Pressure: TR Max Velocity: 2.33 m/s RA   Pressure: 3 mmHg RVSP/PASP: 24.8 mmHg       [id number] Jimi Wright MD, Electronically signed on 7/15/2019 at 1:50:28   PM              *** Final ***                    JIMI WRIGHT MD  This document has been electronically signed. Jul 15 2019  1:02PM             (07-15-19 @ 13:02)      MEDICATIONS  artificial tears (preservative free) Ophthalmic Solution 1  chlorhexidine 4% Liquid 1  dapsone 100  darunavir 800  hydrOXYzine hydrochloride 50  lamoTRIgine 25  tenofovir alafenamide 10 mG/[id number] mG/cobicistat 150 mG/emtricitabine 200 mG (GENVOYA) 1      ANTIBIOTICS:  dapsone 100 milliGRAM(s) Oral daily  darunavir 800 milliGRAM(s) Oral daily  tenofovir alafenamide 10 mG/[id number] mG/cobicistat 150 mG/emtricitabine 200 mG (GENVOYA) 1 Tablet(s) Oral daily

## 2019-07-20 LAB
CULTURE RESULTS: NO GROWTH — SIGNIFICANT CHANGE UP
OLIGOCLONAL BANDS CSF ELPH-IMP: PRESENT
SPECIMEN SOURCE: SIGNIFICANT CHANGE UP

## 2019-07-25 DIAGNOSIS — A81.2 PROGRESSIVE MULTIFOCAL LEUKOENCEPHALOPATHY: ICD-10-CM

## 2019-07-25 DIAGNOSIS — R27.0 ATAXIA, UNSPECIFIED: ICD-10-CM

## 2019-07-25 DIAGNOSIS — Z91.048 OTHER NONMEDICINAL SUBSTANCE ALLERGY STATUS: ICD-10-CM

## 2019-07-25 DIAGNOSIS — Z82.3 FAMILY HISTORY OF STROKE: ICD-10-CM

## 2019-07-25 DIAGNOSIS — B20 HUMAN IMMUNODEFICIENCY VIRUS [HIV] DISEASE: ICD-10-CM

## 2019-07-25 DIAGNOSIS — Z82.49 FAMILY HISTORY OF ISCHEMIC HEART DISEASE AND OTHER DISEASES OF THE CIRCULATORY SYSTEM: ICD-10-CM

## 2019-07-25 DIAGNOSIS — F41.1 GENERALIZED ANXIETY DISORDER: ICD-10-CM

## 2019-07-25 DIAGNOSIS — R29.810 FACIAL WEAKNESS: ICD-10-CM

## 2019-07-25 DIAGNOSIS — Z83.0 FAMILY HISTORY OF HUMAN IMMUNODEFICIENCY VIRUS [HIV] DISEASE: ICD-10-CM

## 2019-07-25 DIAGNOSIS — F31.9 BIPOLAR DISORDER, UNSPECIFIED: ICD-10-CM

## 2019-08-14 LAB
CULTURE RESULTS: SIGNIFICANT CHANGE UP
SPECIMEN SOURCE: SIGNIFICANT CHANGE UP

## 2020-08-06 ENCOUNTER — OUTPATIENT (OUTPATIENT)
Dept: OUTPATIENT SERVICES | Facility: HOSPITAL | Age: 25
LOS: 1 days | Discharge: HOME | End: 2020-08-06

## 2020-08-06 DIAGNOSIS — B20 HUMAN IMMUNODEFICIENCY VIRUS [HIV] DISEASE: ICD-10-CM

## 2020-08-06 DIAGNOSIS — D64.9 ANEMIA, UNSPECIFIED: ICD-10-CM

## 2020-08-06 DIAGNOSIS — A81.2 PROGRESSIVE MULTIFOCAL LEUKOENCEPHALOPATHY: ICD-10-CM

## 2020-08-06 PROBLEM — F31.9 BIPOLAR DISORDER, UNSPECIFIED: Chronic | Status: ACTIVE | Noted: 2019-07-13

## 2020-08-07 ENCOUNTER — TRANSCRIPTION ENCOUNTER (OUTPATIENT)
Age: 25
End: 2020-08-07

## 2020-08-19 ENCOUNTER — OUTPATIENT (OUTPATIENT)
Dept: OUTPATIENT SERVICES | Facility: HOSPITAL | Age: 25
LOS: 1 days | Discharge: HOME | End: 2020-08-19

## 2020-08-19 DIAGNOSIS — R26.89 OTHER ABNORMALITIES OF GAIT AND MOBILITY: ICD-10-CM

## 2020-09-01 LAB
4/8 RATIO: 0.72 RATIO — LOW (ref 0.9–3.6)
ABS CD8: 304 /UL — SIGNIFICANT CHANGE UP (ref 142–740)
ALBUMIN SERPL ELPH-MCNC: 4.2 G/DL — SIGNIFICANT CHANGE UP (ref 3.3–5)
ALP SERPL-CCNC: 62 U/L — SIGNIFICANT CHANGE UP (ref 40–120)
ALT FLD-CCNC: 10 U/L — SIGNIFICANT CHANGE UP (ref 10–45)
ANION GAP SERPL CALC-SCNC: 15 MMOL/L — SIGNIFICANT CHANGE UP (ref 5–17)
AST SERPL-CCNC: 21 U/L — SIGNIFICANT CHANGE UP (ref 10–40)
BILIRUB SERPL-MCNC: <0.2 MG/DL — SIGNIFICANT CHANGE UP (ref 0.2–1.2)
BUN SERPL-MCNC: 9 MG/DL — SIGNIFICANT CHANGE UP (ref 7–23)
CALCIUM SERPL-MCNC: 9.7 MG/DL — SIGNIFICANT CHANGE UP (ref 8.4–10.5)
CD16+CD56+ CELLS NFR BLD: 13 % — SIGNIFICANT CHANGE UP (ref 5–23)
CD16+CD56+ CELLS NFR SPEC: 111 /UL — SIGNIFICANT CHANGE UP (ref 71–410)
CD19 BLASTS SPEC-ACNC: 165 /UL — SIGNIFICANT CHANGE UP (ref 84–469)
CD19 BLASTS SPEC-ACNC: 19 % — SIGNIFICANT CHANGE UP (ref 6–24)
CD3 BLASTS SPEC-ACNC: 564 /UL — LOW (ref 672–1870)
CD3 BLASTS SPEC-ACNC: 66 % — SIGNIFICANT CHANGE UP (ref 59–83)
CD4 %: 26 % — LOW (ref 30–62)
CD8 %: 36 % — SIGNIFICANT CHANGE UP (ref 12–36)
CHLORIDE SERPL-SCNC: 107 MMOL/L — SIGNIFICANT CHANGE UP (ref 96–108)
CHOLEST SERPL-MCNC: 215 MG/DL — HIGH (ref 10–199)
CMV IGG FLD QL: <0.2 U/ML — SIGNIFICANT CHANGE UP
CMV IGG SERPL-IMP: NEGATIVE — SIGNIFICANT CHANGE UP
CO2 SERPL-SCNC: 17 MMOL/L — LOW (ref 22–31)
CREAT SERPL-MCNC: 0.42 MG/DL — LOW (ref 0.5–1.3)
GGT SERPL-CCNC: 26 U/L — SIGNIFICANT CHANGE UP (ref 8–40)
GLUCOSE SERPL-MCNC: 57 MG/DL — LOW (ref 70–99)
HAV IGG SER QL IA: REACTIVE
HAV IGM SER-ACNC: SIGNIFICANT CHANGE UP
HBV CORE AB SER-ACNC: SIGNIFICANT CHANGE UP
HBV SURFACE AB SER-ACNC: REACTIVE
HBV SURFACE AG SER-ACNC: SIGNIFICANT CHANGE UP
HDLC SERPL-MCNC: 54 MG/DL — SIGNIFICANT CHANGE UP
HIV 1+2 AB+HIV1 P24 AG SERPL QL IA: REACTIVE
HIV-1 VIRAL LOAD RESULT: ABNORMAL
HIV1 RNA # SERPL NAA+PROBE: 171 — SIGNIFICANT CHANGE UP
HIV1 RNA SER-IMP: SIGNIFICANT CHANGE UP
HIV1 RNA SERPL NAA+PROBE-ACNC: ABNORMAL
HIV1 RNA SERPL NAA+PROBE-LOG#: 2.23 — SIGNIFICANT CHANGE UP
HIV1+2 AB SPEC QL: ABNORMAL
HIV1+2 AB SPEC QL: SIGNIFICANT CHANGE UP
HSV1 IGG SER-ACNC: 60.4 INDEX — HIGH
HSV1 IGG SERPL QL IA: POSITIVE
HSV2 IGG FLD-ACNC: 0.13 INDEX — SIGNIFICANT CHANGE UP
HSV2 IGG SERPL QL IA: NEGATIVE — SIGNIFICANT CHANGE UP
LDH SERPL L TO P-CCNC: 321 U/L — HIGH (ref 50–242)
LIPID PNL WITH DIRECT LDL SERPL: 145 MG/DL — HIGH
MEV IGG SER-ACNC: <5 AU/ML — SIGNIFICANT CHANGE UP
MEV IGG+IGM SER-IMP: NEGATIVE — SIGNIFICANT CHANGE UP
MUV AB SER-ACNC: POSITIVE — SIGNIFICANT CHANGE UP
MUV IGG FLD-ACNC: 89.7 AU/ML — SIGNIFICANT CHANGE UP
POTASSIUM SERPL-MCNC: 3.9 MMOL/L — SIGNIFICANT CHANGE UP (ref 3.5–5.3)
POTASSIUM SERPL-SCNC: 3.9 MMOL/L — SIGNIFICANT CHANGE UP (ref 3.5–5.3)
PROT SERPL-MCNC: 7.4 G/DL — SIGNIFICANT CHANGE UP (ref 6–8.3)
RUBV IGG SER-ACNC: 1.8 INDEX — SIGNIFICANT CHANGE UP
RUBV IGG SER-IMP: POSITIVE — SIGNIFICANT CHANGE UP
SODIUM SERPL-SCNC: 139 MMOL/L — SIGNIFICANT CHANGE UP (ref 135–145)
T GONDII IGG SER QL: <3 IU/ML — SIGNIFICANT CHANGE UP
T GONDII IGG SER QL: NEGATIVE — SIGNIFICANT CHANGE UP
T PALLIDUM AB TITR SER: NEGATIVE — SIGNIFICANT CHANGE UP
T-CELL CD4 SUBSET PNL BLD: 219 /UL — LOW (ref 489–1457)
TOTAL CHOLESTEROL/HDL RATIO MEASUREMENT: 4 RATIO — SIGNIFICANT CHANGE UP (ref 3.3–7.1)
TRIGL SERPL-MCNC: 82 MG/DL — SIGNIFICANT CHANGE UP (ref 10–149)
VZV IGG FLD QL IA: 23.2 INDEX — SIGNIFICANT CHANGE UP
VZV IGG FLD QL IA: NEGATIVE — SIGNIFICANT CHANGE UP

## 2020-09-02 LAB
24R-OH-CALCIDIOL SERPL-MCNC: 12 NG/ML — LOW (ref 30–80)
A1C WITH ESTIMATED AVERAGE GLUCOSE RESULT: 5.3 % — SIGNIFICANT CHANGE UP (ref 4–5.6)
ESTIMATED AVERAGE GLUCOSE: 105 MG/DL — SIGNIFICANT CHANGE UP (ref 68–114)
GAMMA INTERFERON BACKGROUND BLD IA-ACNC: 0.02 IU/ML — SIGNIFICANT CHANGE UP
M TB IFN-G BLD-IMP: NEGATIVE — SIGNIFICANT CHANGE UP
M TB IFN-G CD4+ BCKGRND COR BLD-ACNC: 0 IU/ML — SIGNIFICANT CHANGE UP
M TB IFN-G CD4+CD8+ BCKGRND COR BLD-ACNC: 0 IU/ML — SIGNIFICANT CHANGE UP
QUANT TB PLUS MITOGEN MINUS NIL: 8.59 IU/ML — SIGNIFICANT CHANGE UP

## 2020-09-03 ENCOUNTER — OUTPATIENT (OUTPATIENT)
Dept: OUTPATIENT SERVICES | Facility: HOSPITAL | Age: 25
LOS: 1 days | Discharge: HOME | End: 2020-09-03

## 2020-09-04 DIAGNOSIS — E78.5 HYPERLIPIDEMIA, UNSPECIFIED: ICD-10-CM

## 2020-09-04 DIAGNOSIS — B20 HUMAN IMMUNODEFICIENCY VIRUS [HIV] DISEASE: ICD-10-CM

## 2020-09-04 DIAGNOSIS — A81.2 PROGRESSIVE MULTIFOCAL LEUKOENCEPHALOPATHY: ICD-10-CM

## 2020-09-05 LAB
A1C WITH ESTIMATED AVERAGE GLUCOSE RESULT: 5.4 % — SIGNIFICANT CHANGE UP (ref 4–5.6)
ALBUMIN SERPL ELPH-MCNC: 4.1 G/DL — SIGNIFICANT CHANGE UP (ref 3.3–5)
ALP SERPL-CCNC: 62 U/L — SIGNIFICANT CHANGE UP (ref 40–120)
ALT FLD-CCNC: 11 U/L — SIGNIFICANT CHANGE UP (ref 10–45)
ANION GAP SERPL CALC-SCNC: 13 MMOL/L — SIGNIFICANT CHANGE UP (ref 5–17)
AST SERPL-CCNC: 19 U/L — SIGNIFICANT CHANGE UP (ref 10–40)
BILIRUB SERPL-MCNC: 0.2 MG/DL — SIGNIFICANT CHANGE UP (ref 0.2–1.2)
BUN SERPL-MCNC: 13 MG/DL — SIGNIFICANT CHANGE UP (ref 7–23)
CALCIUM SERPL-MCNC: 9.3 MG/DL — SIGNIFICANT CHANGE UP (ref 8.4–10.5)
CHLORIDE SERPL-SCNC: 107 MMOL/L — SIGNIFICANT CHANGE UP (ref 96–108)
CO2 SERPL-SCNC: 21 MMOL/L — LOW (ref 22–31)
CREAT SERPL-MCNC: 0.51 MG/DL — SIGNIFICANT CHANGE UP (ref 0.5–1.3)
ESTIMATED AVERAGE GLUCOSE: 108 MG/DL — SIGNIFICANT CHANGE UP (ref 68–114)
GLUCOSE SERPL-MCNC: 74 MG/DL — SIGNIFICANT CHANGE UP (ref 70–99)
HCT VFR BLD CALC: 34.8 % — SIGNIFICANT CHANGE UP (ref 34.5–45)
HGB BLD-MCNC: 10.3 G/DL — LOW (ref 11.5–15.5)
MCHC RBC-ENTMCNC: 28.2 PG — SIGNIFICANT CHANGE UP (ref 27–34)
MCHC RBC-ENTMCNC: 29.6 GM/DL — LOW (ref 32–36)
MCV RBC AUTO: 95.3 FL — SIGNIFICANT CHANGE UP (ref 80–100)
PLATELET # BLD AUTO: 212 K/UL — SIGNIFICANT CHANGE UP (ref 150–400)
POTASSIUM SERPL-MCNC: 4 MMOL/L — SIGNIFICANT CHANGE UP (ref 3.5–5.3)
POTASSIUM SERPL-SCNC: 4 MMOL/L — SIGNIFICANT CHANGE UP (ref 3.5–5.3)
PROT SERPL-MCNC: 6.9 G/DL — SIGNIFICANT CHANGE UP (ref 6–8.3)
RBC # BLD: 3.65 M/UL — LOW (ref 3.8–5.2)
RBC # FLD: 12.9 % — SIGNIFICANT CHANGE UP (ref 10.3–14.5)
SODIUM SERPL-SCNC: 141 MMOL/L — SIGNIFICANT CHANGE UP (ref 135–145)
WBC # BLD: 4.59 K/UL — SIGNIFICANT CHANGE UP (ref 3.8–10.5)
WBC # FLD AUTO: 4.59 K/UL — SIGNIFICANT CHANGE UP (ref 3.8–10.5)

## 2020-09-18 ENCOUNTER — OUTPATIENT (OUTPATIENT)
Dept: OUTPATIENT SERVICES | Facility: HOSPITAL | Age: 25
LOS: 1 days | Discharge: HOME | End: 2020-09-18
Payer: MEDICAID

## 2020-09-18 PROCEDURE — 92002 INTRM OPH EXAM NEW PATIENT: CPT

## 2020-09-29 ENCOUNTER — OUTPATIENT (OUTPATIENT)
Dept: OUTPATIENT SERVICES | Facility: HOSPITAL | Age: 25
LOS: 1 days | Discharge: HOME | End: 2020-09-29

## 2020-09-29 DIAGNOSIS — B20 HUMAN IMMUNODEFICIENCY VIRUS [HIV] DISEASE: ICD-10-CM

## 2020-10-01 DIAGNOSIS — A81.2 PROGRESSIVE MULTIFOCAL LEUKOENCEPHALOPATHY: ICD-10-CM

## 2020-10-27 ENCOUNTER — OUTPATIENT (OUTPATIENT)
Dept: OUTPATIENT SERVICES | Facility: HOSPITAL | Age: 25
LOS: 1 days | Discharge: HOME | End: 2020-10-27

## 2020-11-17 ENCOUNTER — OUTPATIENT (OUTPATIENT)
Dept: OUTPATIENT SERVICES | Facility: HOSPITAL | Age: 25
LOS: 1 days | Discharge: HOME | End: 2020-11-17

## 2020-11-17 DIAGNOSIS — I69.00 UNSPECIFIED SEQUELAE OF NONTRAUMATIC SUBARACHNOID HEMORRHAGE: ICD-10-CM

## 2020-12-08 ENCOUNTER — APPOINTMENT (OUTPATIENT)
Dept: OBGYN | Facility: CLINIC | Age: 25
End: 2020-12-08

## 2020-12-22 ENCOUNTER — OUTPATIENT (OUTPATIENT)
Dept: OUTPATIENT SERVICES | Facility: HOSPITAL | Age: 25
LOS: 1 days | Discharge: HOME | End: 2020-12-22
Payer: MEDICAID

## 2020-12-22 DIAGNOSIS — B20 HUMAN IMMUNODEFICIENCY VIRUS [HIV] DISEASE: ICD-10-CM

## 2020-12-22 DIAGNOSIS — A81.2 PROGRESSIVE MULTIFOCAL LEUKOENCEPHALOPATHY: ICD-10-CM

## 2020-12-22 PROCEDURE — 99422 OL DIG E/M SVC 11-20 MIN: CPT

## 2020-12-31 ENCOUNTER — OUTPATIENT (OUTPATIENT)
Dept: OUTPATIENT SERVICES | Facility: HOSPITAL | Age: 25
LOS: 1 days | Discharge: HOME | End: 2020-12-31
Payer: MEDICAID

## 2020-12-31 VITALS
SYSTOLIC BLOOD PRESSURE: 106 MMHG | RESPIRATION RATE: 16 BRPM | DIASTOLIC BLOOD PRESSURE: 60 MMHG | HEIGHT: 61 IN | TEMPERATURE: 100 F | OXYGEN SATURATION: 100 % | HEART RATE: 84 BPM | WEIGHT: 104.06 LBS

## 2020-12-31 DIAGNOSIS — Z01.818 ENCOUNTER FOR OTHER PREPROCEDURAL EXAMINATION: ICD-10-CM

## 2020-12-31 DIAGNOSIS — M24.571 CONTRACTURE, RIGHT ANKLE: ICD-10-CM

## 2020-12-31 LAB
ALBUMIN SERPL ELPH-MCNC: 4.6 G/DL — SIGNIFICANT CHANGE UP (ref 3.5–5.2)
ALP SERPL-CCNC: 75 U/L — SIGNIFICANT CHANGE UP (ref 30–115)
ALT FLD-CCNC: 11 U/L — SIGNIFICANT CHANGE UP (ref 0–41)
ANION GAP SERPL CALC-SCNC: 12 MMOL/L — SIGNIFICANT CHANGE UP (ref 7–14)
APTT BLD: 31.7 SEC — SIGNIFICANT CHANGE UP (ref 27–39.2)
AST SERPL-CCNC: 20 U/L — SIGNIFICANT CHANGE UP (ref 0–41)
BASOPHILS # BLD AUTO: 0.01 K/UL — SIGNIFICANT CHANGE UP (ref 0–0.2)
BASOPHILS NFR BLD AUTO: 0.2 % — SIGNIFICANT CHANGE UP (ref 0–1)
BILIRUB SERPL-MCNC: 0.3 MG/DL — SIGNIFICANT CHANGE UP (ref 0.2–1.2)
BUN SERPL-MCNC: 8 MG/DL — LOW (ref 10–20)
CALCIUM SERPL-MCNC: 9.2 MG/DL — SIGNIFICANT CHANGE UP (ref 8.5–10.1)
CHLORIDE SERPL-SCNC: 104 MMOL/L — SIGNIFICANT CHANGE UP (ref 98–110)
CO2 SERPL-SCNC: 23 MMOL/L — SIGNIFICANT CHANGE UP (ref 17–32)
CREAT SERPL-MCNC: 0.6 MG/DL — LOW (ref 0.7–1.5)
EOSINOPHIL # BLD AUTO: 0.03 K/UL — SIGNIFICANT CHANGE UP (ref 0–0.7)
EOSINOPHIL NFR BLD AUTO: 0.7 % — SIGNIFICANT CHANGE UP (ref 0–8)
GLUCOSE SERPL-MCNC: 79 MG/DL — SIGNIFICANT CHANGE UP (ref 70–99)
HCT VFR BLD CALC: 34.7 % — LOW (ref 37–47)
HGB BLD-MCNC: 11 G/DL — LOW (ref 12–16)
IMM GRANULOCYTES NFR BLD AUTO: 0.2 % — SIGNIFICANT CHANGE UP (ref 0.1–0.3)
INR BLD: 1.13 RATIO — SIGNIFICANT CHANGE UP (ref 0.65–1.3)
LYMPHOCYTES # BLD AUTO: 1.12 K/UL — LOW (ref 1.2–3.4)
LYMPHOCYTES # BLD AUTO: 27.5 % — SIGNIFICANT CHANGE UP (ref 20.5–51.1)
MCHC RBC-ENTMCNC: 27 PG — SIGNIFICANT CHANGE UP (ref 27–31)
MCHC RBC-ENTMCNC: 31.7 G/DL — LOW (ref 32–37)
MCV RBC AUTO: 85 FL — SIGNIFICANT CHANGE UP (ref 81–99)
MONOCYTES # BLD AUTO: 0.4 K/UL — SIGNIFICANT CHANGE UP (ref 0.1–0.6)
MONOCYTES NFR BLD AUTO: 9.8 % — HIGH (ref 1.7–9.3)
NEUTROPHILS # BLD AUTO: 2.5 K/UL — SIGNIFICANT CHANGE UP (ref 1.4–6.5)
NEUTROPHILS NFR BLD AUTO: 61.6 % — SIGNIFICANT CHANGE UP (ref 42.2–75.2)
NRBC # BLD: 0 /100 WBCS — SIGNIFICANT CHANGE UP (ref 0–0)
PLATELET # BLD AUTO: 134 K/UL — SIGNIFICANT CHANGE UP (ref 130–400)
POTASSIUM SERPL-MCNC: 3.6 MMOL/L — SIGNIFICANT CHANGE UP (ref 3.5–5)
POTASSIUM SERPL-SCNC: 3.6 MMOL/L — SIGNIFICANT CHANGE UP (ref 3.5–5)
PROT SERPL-MCNC: 8.6 G/DL — HIGH (ref 6–8)
PROTHROM AB SERPL-ACNC: 13 SEC — HIGH (ref 9.95–12.87)
RBC # BLD: 4.08 M/UL — LOW (ref 4.2–5.4)
RBC # FLD: 12.9 % — SIGNIFICANT CHANGE UP (ref 11.5–14.5)
SODIUM SERPL-SCNC: 139 MMOL/L — SIGNIFICANT CHANGE UP (ref 135–146)
WBC # BLD: 4.07 K/UL — LOW (ref 4.8–10.8)
WBC # FLD AUTO: 4.07 K/UL — LOW (ref 4.8–10.8)

## 2020-12-31 PROCEDURE — 93010 ELECTROCARDIOGRAM REPORT: CPT

## 2020-12-31 RX ORDER — DAPSONE 100 MG/1
1 TABLET ORAL
Qty: 0 | Refills: 0 | DISCHARGE

## 2020-12-31 RX ORDER — LAMOTRIGINE 25 MG/1
1 TABLET, ORALLY DISINTEGRATING ORAL
Qty: 0 | Refills: 0 | DISCHARGE

## 2020-12-31 RX ORDER — HYDROXYZINE HCL 10 MG
1 TABLET ORAL
Qty: 0 | Refills: 0 | DISCHARGE

## 2020-12-31 NOTE — H&P PST ADULT - HISTORY OF PRESENT ILLNESS
26 yo w/c bound female presents s/p "brain infection 6/2019." pt has inability to "flex either foot for past year, it has gotten worse, my muscles atrophied& got weak" pt is scheduled for b/l achilles lengthening pt denies any discomfort;  denies chest pain, palpitations, shortness of breath, dyspnea, or dysuria. exercise tolerance: pt w/c bound  pt denies any known exposure to COVID-19, denies any S&S    pt instructed re: self quarantine guidelines    Anesthesia Alert  NO--Difficult Airway  NO--History of neck surgery or radiation  NO--Limited ROM of neck  NO--History of Malignant hyperthermia  NO--No personal or family history of Pseudocholinesterase deficiency.  NO--Prior Anesthesia Complication  NO--Latex Allergy  NO--Loose teeth  NO--History of Rheumatoid Arthritis  NO--DONELL  NO--Other_____

## 2020-12-31 NOTE — H&P PST ADULT - NSICDXPASTMEDICALHX_GEN_ALL_CORE_FT
PAST MEDICAL HISTORY:  Anxiety no meds    Bipolar disorder     Brain infection 6/2019    HIV (human immunodeficiency virus infection)

## 2020-12-31 NOTE — H&P PST ADULT - NSANTHOSAYNRD_GEN_A_CORE
No. DONELL screening performed.  STOP BANG Legend: 0-2 = LOW Risk; 3-4 = INTERMEDIATE Risk; 5-8 = HIGH Risk

## 2021-01-18 ENCOUNTER — LABORATORY RESULT (OUTPATIENT)
Age: 26
End: 2021-01-18

## 2021-01-18 ENCOUNTER — OUTPATIENT (OUTPATIENT)
Dept: OUTPATIENT SERVICES | Facility: HOSPITAL | Age: 26
LOS: 1 days | Discharge: HOME | End: 2021-01-18

## 2021-01-18 DIAGNOSIS — Z11.59 ENCOUNTER FOR SCREENING FOR OTHER VIRAL DISEASES: ICD-10-CM

## 2021-01-18 PROBLEM — G04.90 ENCEPHALITIS AND ENCEPHALOMYELITIS, UNSPECIFIED: Chronic | Status: ACTIVE | Noted: 2020-12-31

## 2021-01-18 PROBLEM — F41.9 ANXIETY DISORDER, UNSPECIFIED: Chronic | Status: ACTIVE | Noted: 2019-07-14

## 2021-01-19 ENCOUNTER — OUTPATIENT (OUTPATIENT)
Dept: OUTPATIENT SERVICES | Facility: HOSPITAL | Age: 26
LOS: 1 days | Discharge: HOME | End: 2021-01-19
Payer: MEDICAID

## 2021-01-19 DIAGNOSIS — B20 HUMAN IMMUNODEFICIENCY VIRUS [HIV] DISEASE: ICD-10-CM

## 2021-01-19 PROCEDURE — 99214 OFFICE O/P EST MOD 30 MIN: CPT

## 2021-01-20 NOTE — ASU PATIENT PROFILE, ADULT - PMH
Anxiety  no meds  Bipolar disorder    Brain infection  6/2019  HIV (human immunodeficiency virus infection)

## 2021-01-20 NOTE — ASU PATIENT PROFILE, ADULT - VISION (WITH CORRECTIVE LENSES IF THE PATIENT USUALLY WEARS THEM):
Normal vision: sees adequately in most situations; can see medication labels, newsprint 29-Mar-2020 21:18

## 2021-01-21 ENCOUNTER — OUTPATIENT (OUTPATIENT)
Dept: OUTPATIENT SERVICES | Facility: HOSPITAL | Age: 26
LOS: 1 days | Discharge: HOME | End: 2021-01-21

## 2021-01-21 VITALS
RESPIRATION RATE: 16 BRPM | OXYGEN SATURATION: 100 % | HEART RATE: 66 BPM | SYSTOLIC BLOOD PRESSURE: 104 MMHG | DIASTOLIC BLOOD PRESSURE: 68 MMHG

## 2021-01-21 VITALS — WEIGHT: 104.06 LBS

## 2021-01-21 RX ORDER — MORPHINE SULFATE 50 MG/1
2 CAPSULE, EXTENDED RELEASE ORAL
Refills: 0 | Status: DISCONTINUED | OUTPATIENT
Start: 2021-01-21 | End: 2021-01-21

## 2021-01-21 RX ORDER — HYDROMORPHONE HYDROCHLORIDE 2 MG/ML
0.5 INJECTION INTRAMUSCULAR; INTRAVENOUS; SUBCUTANEOUS
Refills: 0 | Status: DISCONTINUED | OUTPATIENT
Start: 2021-01-21 | End: 2021-01-21

## 2021-01-21 RX ORDER — IBUPROFEN 200 MG
1 TABLET ORAL
Qty: 90 | Refills: 0
Start: 2021-01-21 | End: 2021-02-19

## 2021-01-21 RX ORDER — ASPIRIN/CALCIUM CARB/MAGNESIUM 324 MG
1 TABLET ORAL
Qty: 60 | Refills: 0
Start: 2021-01-21 | End: 2021-02-19

## 2021-01-21 RX ORDER — ONDANSETRON 8 MG/1
4 TABLET, FILM COATED ORAL ONCE
Refills: 0 | Status: DISCONTINUED | OUTPATIENT
Start: 2021-01-21 | End: 2021-02-04

## 2021-01-21 RX ORDER — CEPHALEXIN 500 MG
1 CAPSULE ORAL
Qty: 28 | Refills: 0
Start: 2021-01-21 | End: 2021-01-27

## 2021-01-21 RX ORDER — SODIUM CHLORIDE 9 MG/ML
1000 INJECTION, SOLUTION INTRAVENOUS
Refills: 0 | Status: DISCONTINUED | OUTPATIENT
Start: 2021-01-21 | End: 2021-02-04

## 2021-01-21 RX ORDER — ACETAMINOPHEN 500 MG
650 TABLET ORAL ONCE
Refills: 0 | Status: COMPLETED | OUTPATIENT
Start: 2021-01-21 | End: 2021-01-21

## 2021-01-21 RX ORDER — LANOLIN ALCOHOL/MO/W.PET/CERES
1 CREAM (GRAM) TOPICAL
Qty: 0 | Refills: 0 | DISCHARGE

## 2021-01-21 RX ADMIN — Medication 650 MILLIGRAM(S): at 14:45

## 2021-01-21 RX ADMIN — MORPHINE SULFATE 2 MILLIGRAM(S): 50 CAPSULE, EXTENDED RELEASE ORAL at 12:25

## 2021-01-21 RX ADMIN — SODIUM CHLORIDE 100 MILLILITER(S): 9 INJECTION, SOLUTION INTRAVENOUS at 12:04

## 2021-01-21 RX ADMIN — HYDROMORPHONE HYDROCHLORIDE 0.5 MILLIGRAM(S): 2 INJECTION INTRAMUSCULAR; INTRAVENOUS; SUBCUTANEOUS at 12:42

## 2021-01-21 NOTE — BRIEF OPERATIVE NOTE - NSICDXBRIEFPREOP_GEN_ALL_CORE_FT
PRE-OP DIAGNOSIS:  Equinus contracture of right ankle 21-Jan-2021 12:10:21  Valentina Lacy  Equinus contracture of left ankle 21-Jan-2021 12:10:08  Valentina Lacy

## 2021-01-21 NOTE — BRIEF OPERATIVE NOTE - NSICDXBRIEFPOSTOP_GEN_ALL_CORE_FT
POST-OP DIAGNOSIS:  Equinus contracture of left ankle 21-Jan-2021 12:10:34  Valentina Lacy  Equinus contracture of right ankle 21-Jan-2021 12:10:27  Valentina Lacy

## 2021-01-21 NOTE — ASU DISCHARGE PLAN (ADULT/PEDIATRIC) - CARE PROVIDER_API CALL
Valentina Lacy)  Orthopaedic Surgery  [address] [place] [id number]  Phone: [phone]  Fax: [phone]  Established Patient  Follow Up Time: 2 weeks

## 2021-01-21 NOTE — ASU PREOP CHECKLIST - SURGICAL CONSENT
----- Message from Sy Leon MD sent at 9/15/2020  7:44 AM CDT -----  Let patient know tests are normal      
Called patient and let him know of results and he verbalized understanding and was thankful for services provided.   
Sy Crook Car Nurse [facility]             Let patient know tests are normal         XR CHEST PA AND LATERAL  Order: [id number]  Status:  Final result   Visible to patient:  No (not released) Dx:  Encounter for monitoring amiodarone t...  Details    Reading Physician Reading Date Result Priority   Latrell Mattson MD  [phone] 9/14/2020       Narrative & Impression     EXAM: XR CHEST PA AND LATERAL     INDICATION: Encounter for therapeutic drug level monitoring     FINDINGS:   Heart size and pulmonary vascularity are normal. Moderately tortuous aorta.  Lungs are clear. No evidence for pneumonitis.  Minor hypertrophic changes lower thoracic spine. Mild compression of  several midthoracic vertebral bodies, unchanged from 9/9/2019. Degenerative  changes left shoulder.           IMPRESSION:  No acute thoracic abnormality.               Specimen Collected: 09/14/20 14:58 Last Resulted: 09/14/20 14:59        Order Details      View Encounter      Lab and Collection Details      Routing      Result History           Result Communications      Result Notes     Sy Leon MD   9/15/2020  7:44 AM      Let patient know tests are normal                     
done

## 2021-01-27 DIAGNOSIS — Z21 ASYMPTOMATIC HUMAN IMMUNODEFICIENCY VIRUS [HIV] INFECTION STATUS: ICD-10-CM

## 2021-01-27 DIAGNOSIS — M67.01 SHORT ACHILLES TENDON (ACQUIRED), RIGHT ANKLE: ICD-10-CM

## 2021-01-27 DIAGNOSIS — M67.02 SHORT ACHILLES TENDON (ACQUIRED), LEFT ANKLE: ICD-10-CM

## 2021-02-16 ENCOUNTER — OUTPATIENT (OUTPATIENT)
Dept: OUTPATIENT SERVICES | Facility: HOSPITAL | Age: 26
LOS: 1 days | Discharge: HOME | End: 2021-02-16
Payer: MEDICAID

## 2021-02-16 DIAGNOSIS — B20 HUMAN IMMUNODEFICIENCY VIRUS [HIV] DISEASE: ICD-10-CM

## 2021-02-16 PROCEDURE — ZZZZZ: CPT

## 2021-03-04 LAB — TSH SERPL-MCNC: 1.2 UIU/ML — SIGNIFICANT CHANGE UP (ref 0.27–4.2)

## 2021-03-05 LAB
4/8 RATIO: 0.11 RATIO — LOW (ref 0.9–3.6)
A1C WITH ESTIMATED AVERAGE GLUCOSE RESULT: 5.5 % — SIGNIFICANT CHANGE UP (ref 4–5.6)
ABS CD8: 1636 /UL — HIGH (ref 142–740)
CD16+CD56+ CELLS NFR BLD: 6 % — SIGNIFICANT CHANGE UP (ref 5–23)
CD16+CD56+ CELLS NFR SPEC: 148 /UL — SIGNIFICANT CHANGE UP (ref 71–410)
CD19 BLASTS SPEC-ACNC: 209 /UL — SIGNIFICANT CHANGE UP (ref 84–469)
CD19 BLASTS SPEC-ACNC: 9 % — SIGNIFICANT CHANGE UP (ref 6–24)
CD3 BLASTS SPEC-ACNC: 1919 /UL — HIGH (ref 672–1870)
CD3 BLASTS SPEC-ACNC: 84 % — HIGH (ref 59–83)
CD4 %: 8 % — LOW (ref 30–62)
CD8 %: 73 % — HIGH (ref 12–36)
ESTIMATED AVERAGE GLUCOSE: 111 MG/DL — SIGNIFICANT CHANGE UP (ref 68–114)
HAV IGG SER QL IA: REACTIVE
HAV IGM SER-ACNC: SIGNIFICANT CHANGE UP
HBV CORE AB SER-ACNC: SIGNIFICANT CHANGE UP
HBV SURFACE AB SER-ACNC: REACTIVE
HBV SURFACE AG SER-ACNC: SIGNIFICANT CHANGE UP
HCT VFR BLD CALC: 32.1 % — LOW (ref 34.5–45)
HGB BLD-MCNC: 10 G/DL — LOW (ref 11.5–15.5)
MCHC RBC-ENTMCNC: 26.9 PG — LOW (ref 27–34)
MCHC RBC-ENTMCNC: 31.2 GM/DL — LOW (ref 32–36)
MCV RBC AUTO: 86.3 FL — SIGNIFICANT CHANGE UP (ref 80–100)
PLATELET # BLD AUTO: 122 K/UL — LOW (ref 150–400)
RBC # BLD: 3.72 M/UL — LOW (ref 3.8–5.2)
RBC # FLD: 13.3 % — SIGNIFICANT CHANGE UP (ref 10.3–14.5)
T PALLIDUM AB TITR SER: NEGATIVE — SIGNIFICANT CHANGE UP
T-CELL CD4 SUBSET PNL BLD: 187 /UL — LOW (ref 489–1457)
WBC # BLD: 5.26 K/UL — SIGNIFICANT CHANGE UP (ref 3.8–10.5)
WBC # FLD AUTO: 5.26 K/UL — SIGNIFICANT CHANGE UP (ref 3.8–10.5)

## 2021-03-06 LAB
HIV-1 VIRAL LOAD RESULT: ABNORMAL
HIV1 RNA # SERPL NAA+PROBE: SIGNIFICANT CHANGE UP
HIV1 RNA SER-IMP: SIGNIFICANT CHANGE UP
HIV1 RNA SERPL NAA+PROBE-ACNC: ABNORMAL
HIV1 RNA SERPL NAA+PROBE-LOG#: 4.95 — SIGNIFICANT CHANGE UP

## 2021-03-07 LAB
GAMMA INTERFERON BACKGROUND BLD IA-ACNC: 0.03 IU/ML — SIGNIFICANT CHANGE UP
M TB IFN-G BLD-IMP: NEGATIVE — SIGNIFICANT CHANGE UP
M TB IFN-G CD4+ BCKGRND COR BLD-ACNC: 0.01 IU/ML — SIGNIFICANT CHANGE UP
M TB IFN-G CD4+CD8+ BCKGRND COR BLD-ACNC: 0.01 IU/ML — SIGNIFICANT CHANGE UP
QUANT TB PLUS MITOGEN MINUS NIL: 5.16 IU/ML — SIGNIFICANT CHANGE UP

## 2021-03-08 LAB
24R-OH-CALCIDIOL SERPL-MCNC: 13.3 NG/ML — LOW (ref 30–80)
ALBUMIN SERPL ELPH-MCNC: 4 G/DL — SIGNIFICANT CHANGE UP (ref 3.3–5)
ALP SERPL-CCNC: 65 U/L — SIGNIFICANT CHANGE UP (ref 40–120)
ALT FLD-CCNC: 6 U/L — LOW (ref 10–45)
ANION GAP SERPL CALC-SCNC: 11 MMOL/L — SIGNIFICANT CHANGE UP (ref 5–17)
AST SERPL-CCNC: 14 U/L — SIGNIFICANT CHANGE UP (ref 10–40)
BILIRUB SERPL-MCNC: <0.2 MG/DL — SIGNIFICANT CHANGE UP (ref 0.2–1.2)
BUN SERPL-MCNC: 9 MG/DL — SIGNIFICANT CHANGE UP (ref 7–23)
CALCIUM SERPL-MCNC: 9.4 MG/DL — SIGNIFICANT CHANGE UP (ref 8.4–10.5)
CHLORIDE SERPL-SCNC: 109 MMOL/L — HIGH (ref 96–108)
CHOLEST SERPL-MCNC: 189 MG/DL — SIGNIFICANT CHANGE UP
CO2 SERPL-SCNC: 21 MMOL/L — LOW (ref 22–31)
CREAT SERPL-MCNC: 0.67 MG/DL — SIGNIFICANT CHANGE UP (ref 0.5–1.3)
GGT SERPL-CCNC: 20 U/L — SIGNIFICANT CHANGE UP (ref 8–40)
GLUCOSE SERPL-MCNC: 97 MG/DL — SIGNIFICANT CHANGE UP (ref 70–99)
HDLC SERPL-MCNC: 39 MG/DL — LOW
LDH SERPL L TO P-CCNC: 162 U/L — SIGNIFICANT CHANGE UP (ref 50–242)
LIPID PNL WITH DIRECT LDL SERPL: 136 MG/DL — HIGH
NON HDL CHOLESTEROL: 149 MG/DL — HIGH
POTASSIUM SERPL-MCNC: 3.9 MMOL/L — SIGNIFICANT CHANGE UP (ref 3.5–5.3)
POTASSIUM SERPL-SCNC: 3.9 MMOL/L — SIGNIFICANT CHANGE UP (ref 3.5–5.3)
PROT SERPL-MCNC: 7.6 G/DL — SIGNIFICANT CHANGE UP (ref 6–8.3)
SODIUM SERPL-SCNC: 142 MMOL/L — SIGNIFICANT CHANGE UP (ref 135–145)
TRIGL SERPL-MCNC: 69 MG/DL — SIGNIFICANT CHANGE UP

## 2021-03-23 ENCOUNTER — OUTPATIENT (OUTPATIENT)
Dept: OUTPATIENT SERVICES | Facility: HOSPITAL | Age: 26
LOS: 1 days | Discharge: HOME | End: 2021-03-23
Payer: MEDICAID

## 2021-03-23 DIAGNOSIS — E55.9 VITAMIN D DEFICIENCY, UNSPECIFIED: ICD-10-CM

## 2021-03-23 DIAGNOSIS — B20 HUMAN IMMUNODEFICIENCY VIRUS [HIV] DISEASE: ICD-10-CM

## 2021-03-23 PROCEDURE — 99422 OL DIG E/M SVC 11-20 MIN: CPT

## 2021-04-15 DIAGNOSIS — D64.9 ANEMIA, UNSPECIFIED: ICD-10-CM

## 2021-04-15 DIAGNOSIS — Z82.3 FAMILY HISTORY OF STROKE: ICD-10-CM

## 2021-04-15 DIAGNOSIS — F32.9 MAJOR DEPRESSIVE DISORDER, SINGLE EPISODE, UNSPECIFIED: ICD-10-CM

## 2021-04-15 RX ORDER — VORTIOXETINE 20 MG/1
20 TABLET, FILM COATED ORAL DAILY
Refills: 0 | Status: ACTIVE | COMMUNITY

## 2021-04-15 RX ORDER — DOCUSATE SODIUM 100 MG/1
100 CAPSULE ORAL DAILY
Refills: 0 | Status: ACTIVE | COMMUNITY

## 2021-04-15 RX ORDER — DIPHENHYDRAMINE HCL 25 MG/1
25 TABLET ORAL DAILY
Refills: 0 | Status: ACTIVE | COMMUNITY

## 2021-04-24 LAB
HIV 1 RNA IN SERPLBLD-SEQ: SIGNIFICANT CHANGE UP
HIV GENOSURE ARCHIVE 1: SIGNIFICANT CHANGE UP
HIV GENOSURE ARCHIVE 2: SIGNIFICANT CHANGE UP
HIV GENOSURE ARCHIVE INTERP: SIGNIFICANT CHANGE UP

## 2021-04-27 ENCOUNTER — APPOINTMENT (OUTPATIENT)
Dept: INFECTIOUS DISEASE | Facility: CLINIC | Age: 26
End: 2021-04-27

## 2021-04-27 NOTE — ASU PATIENT PROFILE, ADULT - NSTRANFUSIONPLAN_GEN_ALL_CORE_SIUH

## 2021-04-29 ENCOUNTER — APPOINTMENT (OUTPATIENT)
Dept: INFECTIOUS DISEASE | Facility: CLINIC | Age: 26
End: 2021-04-29
Payer: MEDICARE

## 2021-04-30 ENCOUNTER — OUTPATIENT (OUTPATIENT)
Dept: OUTPATIENT SERVICES | Facility: HOSPITAL | Age: 26
LOS: 1 days | Discharge: HOME | End: 2021-04-30

## 2021-04-30 DIAGNOSIS — B20 HUMAN IMMUNODEFICIENCY VIRUS [HIV] DISEASE: ICD-10-CM

## 2021-04-30 PROCEDURE — 99441: CPT

## 2021-04-30 NOTE — HISTORY OF PRESENT ILLNESS
[FreeTextEntry1] : #s/p J&J vaccine 4/28- called bc worried about side effects\par nonsmoker, has BC implant\par discussed that sinus venous thrombosis is Very RARE\par Discussed warning signs such as severe HA, blurry vision, dizziness\par \par #HIV\par - recent non-adherence with VL up to 80k in 3/2021\par - med SE of nausea\par - Now taking\par - will recheck VL\par \par #Hx PML c/b weakness and atrophy\par - recent achilles lengthening surgery, finally got cast off\par \par #pap - due

## 2021-04-30 NOTE — REASON FOR VISIT
[Home] : at home, [unfilled] , at the time of the visit. [Medical Office: ([facility])___] : at the medical office located in  [Verbal consent obtained from patient] : the patient, [unfilled] [Follow-Up: _____] : a [unfilled] follow-up visit
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
## 2021-06-01 ENCOUNTER — OUTPATIENT (OUTPATIENT)
Dept: OUTPATIENT SERVICES | Facility: HOSPITAL | Age: 26
LOS: 1 days | Discharge: HOME | End: 2021-06-01

## 2021-06-01 ENCOUNTER — APPOINTMENT (OUTPATIENT)
Dept: INFECTIOUS DISEASE | Facility: CLINIC | Age: 26
End: 2021-06-01
Payer: MEDICARE

## 2021-06-01 PROCEDURE — 99441: CPT | Mod: GC

## 2021-06-01 RX ORDER — BACLOFEN 5 MG/1
5 TABLET ORAL EVERY 8 HOURS
Refills: 0 | Status: ACTIVE | COMMUNITY

## 2021-06-01 RX ORDER — ONDANSETRON 4 MG/1
4 TABLET, ORALLY DISINTEGRATING ORAL AS DIRECTED
Qty: 30 | Refills: 6 | Status: ACTIVE | COMMUNITY
Start: 2021-06-01 | End: 1900-01-01

## 2021-06-01 NOTE — HISTORY OF PRESENT ILLNESS
[FreeTextEntry1] : #AIDS, hx PML\par - recent non-adherence with VL up to 80k in 3/2021\par - med SE of nausea- reports adherence now \par - Now taking\par - will recheck VL\par \par Genosure Archive 4/6/21\par Abacavir        Ziagen       Sensitive\par    Elio*: T215F\par Didanosine      Videx        Sensitive\par    Elio*: T215F\par Emtricitabine   Emtriva      Sensitive\par    Elio*: A62V, T215F\par Lamivudine      Epivir       Sensitive\par    Elio*: A62V, T215F\par Stavudine       Zerit        Sensitive\par    Elio*: A62V, T215F\par Tenofovir       Viread       Sensitive                   1\par    Elio*: A62V, T215F\par Zidovudine      Retrovir      Resistant                  1\par    Elio*: A62V, T215F, N348I\par NNRTI\par Doravirine      Pifeltro      Resistant\par    Elio*: K103R, I135T, Y181Y/C, Y188L, Q207E, N348I\par Efavirenz       Sustiva       Resistant\par    Elio*: K103R, V179E, Y181Y/C, Y188L, N348I\par Etravirine      Intelence     Resistant\par    Elio*: V179E, Y181Y/C, Y188L, N348I\par Nevirapine      Viramune      Resistant\par    Elio*: K103R, V179E, Y181Y/C, Y188L, N348I\par Rilpivirine     Edurant       Resistant\par    Elio*: K103R, Y181Y/C, Y188L\par INI\par Bictegravir     Bictegravir  Sensitive\par    Elio*: None\par Dolutegravir    Tivicay      Sensitive\par    Elio*: None\par Elvitegravir    Vitekta      Sensitive\par    Elio*: None\par Raltegravir     Isentress    Sensitive\par    Elio*: None\par PI\par Atazanavir/r    Reyataz / r  Sensitive\par    Elio*: M36I, I62V\par Darunavir/r     Prezista / r Sensitive\par    Elio*: None\par Fosamprenavir/r Lexiva / r   Sensitive\par    Elio*: None\par Indinavir/r     Crixivan / r Sensitive\par    Elio*: M36I\par Lopinavir       Kaletra      Sensitive\par    Elio*: None\par Nelfinavir      Viracept     Sensitive\par    Elio*: M36I\par Ritonavir       Norvir       Sensitive\par    Elio*: None\par Saquinavir/r    Invirase / r Sensitive\par    Elio*: I62V\par Tipranavir/r    Aptivus / r  Sensitive\par    Elio*: M36I\par *Elio = Resistance Associated Mutations observed\par Summary of Mutations Observed:\par RT: P4T, E6K, V8I, V35I, A62V, A98S, K103R, K122E, I135T,\par     C162S, V179E, Y181Y/C, Y188L, T200A, I202V, Q207E,\par     H208Y, T215F, V245K, A272P, R277K, T286A, F346V, N348I,\par     M357T, K358R, A376V, K390R, E396D, A400T\par IN: T112I, V113I, T124A, D167E, V234L\par MI: K14R, I15V, L19I, M36I, I62V, L63A, E65D, I72V\par \par #Hx PML c/b weakness and atrophy\par - recent achilles lengthening surgery, finally got cast off\par \par #pap - due \par \par #s/p J&J vaccine 4/28-

## 2021-06-01 NOTE — ASSESSMENT
[FreeTextEntry1] : - Check VL and other labs\par - Will find old genotypes from [facility]- possible change to Biktarvy

## 2021-06-01 NOTE — REASON FOR VISIT
[Home] : at home, [unfilled] , at the time of the visit. [Medical Office: ([facility])___] : at the medical office located in  [Follow-Up: _____] : a [unfilled] follow-up visit

## 2021-06-22 NOTE — PROGRESS NOTE ADULT - REASON FOR ADMISSION
Righty sided weakness Call primary care provider for follow up after discharge/Activities as tolerated/Low salt diet/Monitor weight daily/Report signs and symptoms to primary care provider

## 2021-06-29 ENCOUNTER — APPOINTMENT (OUTPATIENT)
Dept: INFECTIOUS DISEASE | Facility: CLINIC | Age: 26
End: 2021-06-29
Payer: MEDICARE

## 2021-06-29 ENCOUNTER — OUTPATIENT (OUTPATIENT)
Dept: OUTPATIENT SERVICES | Facility: HOSPITAL | Age: 26
LOS: 1 days | Discharge: HOME | End: 2021-06-29

## 2021-06-29 ENCOUNTER — NON-APPOINTMENT (OUTPATIENT)
Age: 26
End: 2021-06-29

## 2021-06-29 DIAGNOSIS — Z21 ASYMPTOMATIC HUMAN IMMUNODEFICIENCY VIRUS [HIV] INFECTION STATUS: ICD-10-CM

## 2021-06-29 DIAGNOSIS — B20 HUMAN IMMUNODEFICIENCY VIRUS [HIV] DISEASE: ICD-10-CM

## 2021-06-29 PROCEDURE — 99442: CPT

## 2021-06-29 NOTE — HISTORY OF PRESENT ILLNESS
[Home] : at home, [unfilled] , at the time of the visit. [Medical Office: ([facility])___] : at the medical office located in  [Verbal consent obtained from patient] : the patient, [unfilled] [FreeTextEntry1] : #AIDS, hx PML\par - now saying needs to pay for meds \par - recent non-adherence with VL up to 80k in 3/2021\par - med SE of nausea- reports adherence now \par - Now taking\par - will recheck VL\par - on genvoya, prezista \par \par Genosure Archive 4/6/21\par Abacavir Ziagen Sensitive\par  Elio*: T215F\par Didanosine Videx Sensitive\par  Elio*: T215F\par Emtricitabine Emtriva Sensitive\par  Elio*: A62V, T215F\par Lamivudine Epivir Sensitive\par  Elio*: A62V, T215F\par Stavudine Zerit Sensitive\par  Elio*: A62V, T215F\par Tenofovir Viread Sensitive  1\par  Elio*: A62V, T215F\par Zidovudine Retrovir Resistant  1\par  Elio*: A62V, T215F, N348I\par NNRTI\par Doravirine Pifeltro Resistant\par  Elio*: K103R, I135T, Y181Y/C, Y188L, Q207E, N348I\par Efavirenz Sustiva Resistant\par  Elio*: K103R, V179E, Y181Y/C, Y188L, N348I\par Etravirine Intelence Resistant\par  Elio*: V179E, Y181Y/C, Y188L, N348I\par Nevirapine Viramune Resistant\par  Elio*: K103R, V179E, Y181Y/C, Y188L, N348I\par Rilpivirine Edurant Resistant\par  Elio*: K103R, Y181Y/C, Y188L\par INI\par Bictegravir Bictegravir Sensitive\par  Eilo*: None\par Dolutegravir Tivicay Sensitive\par  Elio*: None\par Elvitegravir Vitekta Sensitive\par  Elio*: None\par Raltegravir Isentress Sensitive\par  Elio*: None\par PI\par Atazanavir/r Reyataz / r Sensitive\par  Elio*: M36I, I62V\par Darunavir/r Prezista / r Sensitive\par  Elio*: None\par Fosamprenavir/r Lexiva / r Sensitive\par  Elio*: None\par Indinavir/r Crixivan / r Sensitive\par  Elio*: M36I\par Lopinavir Kaletra Sensitive\par  Elio*: None\par Nelfinavir Viracept Sensitive\par  Elio*: M36I\par Ritonavir Norvir Sensitive\par  Elio*: None\par Saquinavir/r Invirase / r Sensitive\par  Elio*: I62V\par Tipranavir/r Aptivus / r Sensitive\par  Elio*: M36I\par *Elio = Resistance Associated Mutations observed\par Summary of Mutations Observed:\par RT: P4T, E6K, V8I, V35I, A62V, A98S, K103R, K122E, I135T,\par  C162S, V179E, Y181Y/C, Y188L, T200A, I202V, Q207E,\par  H208Y, T215F, V245K, A272P, R277K, T286A, F346V, N348I,\par  M357T, K358R, A376V, K390R, E396D, A400T\par IN: T112I, V113I, T124A, D167E, V234L\par MO: K14R, I15V, L19I, M36I, I62V, L63A, E65D, I72V\par \par #Hx PML c/b weakness and atrophy\par - recent achilles lengthening surgery, finally got cast off\par \par #pap - due \par \par #s/p J&J vaccine 4/28- \par \par telephone visit 11 min\par  [Sexually Active] : The patient is not sexually active

## 2021-06-29 NOTE — ASSESSMENT
[FreeTextEntry1] : - Ordered labs\par - discussed importance with patient of rechecking VL\par - reach out to CM about why now $$ for meds\par - Possible switch to Biktarvy \par - has [employer] \par - check with [facility] re: PT  [Treatment Adherence] : treatment adherence

## 2021-07-20 ENCOUNTER — APPOINTMENT (OUTPATIENT)
Dept: INFECTIOUS DISEASE | Facility: CLINIC | Age: 26
End: 2021-07-20

## 2021-08-02 ENCOUNTER — NON-APPOINTMENT (OUTPATIENT)
Age: 26
End: 2021-08-02

## 2021-08-03 ENCOUNTER — APPOINTMENT (OUTPATIENT)
Dept: INFECTIOUS DISEASE | Facility: CLINIC | Age: 26
End: 2021-08-03

## 2021-11-02 ENCOUNTER — LABORATORY RESULT (OUTPATIENT)
Age: 26
End: 2021-11-02

## 2021-11-10 ENCOUNTER — NON-APPOINTMENT (OUTPATIENT)
Age: 26
End: 2021-11-10

## 2021-11-11 ENCOUNTER — OUTPATIENT (OUTPATIENT)
Dept: OUTPATIENT SERVICES | Facility: HOSPITAL | Age: 26
LOS: 1 days | Discharge: HOME | End: 2021-11-11

## 2021-11-11 ENCOUNTER — APPOINTMENT (OUTPATIENT)
Dept: INFECTIOUS DISEASE | Facility: CLINIC | Age: 26
End: 2021-11-11
Payer: MEDICARE

## 2021-11-11 DIAGNOSIS — B20 HUMAN IMMUNODEFICIENCY VIRUS [HIV] DISEASE: ICD-10-CM

## 2021-11-11 PROCEDURE — 99422 OL DIG E/M SVC 11-20 MIN: CPT

## 2021-11-11 RX ORDER — ELVITEGRAVIR, COBICISTAT, EMTRICITABINE, AND TENOFOVIR ALAFENAMIDE 150; 150; 200; 10 MG/1; MG/1; MG/1; MG/1
150-150-200-10 TABLET ORAL DAILY
Refills: 0 | Status: COMPLETED | COMMUNITY
End: 2021-11-11

## 2021-11-11 RX ORDER — DARUNAVIR 800 MG/1
800 TABLET, FILM COATED ORAL DAILY
Refills: 0 | Status: COMPLETED | COMMUNITY
End: 2021-11-11

## 2021-12-02 ENCOUNTER — OUTPATIENT (OUTPATIENT)
Dept: OUTPATIENT SERVICES | Facility: HOSPITAL | Age: 26
LOS: 1 days | Discharge: HOME | End: 2021-12-02

## 2021-12-02 ENCOUNTER — APPOINTMENT (OUTPATIENT)
Dept: INFECTIOUS DISEASE | Facility: CLINIC | Age: 26
End: 2021-12-02
Payer: MEDICAID

## 2021-12-02 DIAGNOSIS — E55.9 VITAMIN D DEFICIENCY, UNSPECIFIED: ICD-10-CM

## 2021-12-02 DIAGNOSIS — B20 HUMAN IMMUNODEFICIENCY VIRUS [HIV] DISEASE: ICD-10-CM

## 2021-12-02 PROCEDURE — ZZZZZ: CPT

## 2021-12-02 NOTE — REASON FOR VISIT
[Follow-Up: _____] : a [unfilled] follow-up visit [Home] : at home, [unfilled] , at the time of the visit. [Medical Office: ([facility])___] : at the medical office located in  [Verbal consent obtained from patient] : the patient, [unfilled]

## 2021-12-02 NOTE — ASSESSMENT
[Treatment Education] : treatment education [Treatment Adherence] : treatment adherence [FreeTextEntry1] : #AIDS, hx PML\par - recent non-adherence with VL up to 80k in 3/2021 -> HIV VL 471254\par - CD4 153/10% 11/2/2021 \par - will switch to Biktarvy\par \par Genosure Archive 4/6/21\par Abacavir Ziagen Sensitive\par  Elio*: T215F\par Didanosine Videx Sensitive\par  Elio*: T215F\par Emtricitabine Emtriva Sensitive\par  Elio*: A62V, T215F\par Lamivudine Epivir Sensitive\par  Elio*: A62V, T215F\par Stavudine Zerit Sensitive\par  Elio*: A62V, T215F\par Tenofovir Viread Sensitive 1\par  Elio*: A62V, T215F\par Zidovudine Retrovir Resistant 1\par  Elio*: A62V, T215F, N348I\par NNRTI\par Doravirine Pifeltro Resistant\par  Elio*: K103R, I135T, Y181Y/C, Y188L, Q207E, N348I\par Efavirenz Sustiva Resistant\par  Elio*: K103R, V179E, Y181Y/C, Y188L, N348I\par Etravirine Intelence Resistant\par  Elio*: V179E, Y181Y/C, Y188L, N348I\par Nevirapine Viramune Resistant\par  Elio*: K103R, V179E, Y181Y/C, Y188L, N348I\par Rilpivirine Edurant Resistant\par  Elio*: K103R, Y181Y/C, Y188L\par INI\par Bictegravir Bictegravir Sensitive\par  Elio*: None\par Dolutegravir Tivicay Sensitive\par  Elio*: None\par Elvitegravir Vitekta Sensitive\par  Elio*: None\par Raltegravir Isentress Sensitive\par  Elio*: None\par PI\par Atazanavir/r Reyataz / r Sensitive\par  lEio*: M36I, I62V\par Darunavir/r Prezista / r Sensitive\par  Elio*: None\par Fosamprenavir/r Lexiva / r Sensitive\par  Elio*: None\par Indinavir/r Crixivan / r Sensitive\par  Elio*: M36I\par Lopinavir Kaletra Sensitive\par  Elio*: None\par Nelfinavir Viracept Sensitive\par  Elio*: M36I\par Ritonavir Norvir Sensitive\par  Elio*: None\par Saquinavir/r Invirase / r Sensitive\par  Elio*: I62V\par Tipranavir/r Aptivus / r Sensitive\par  Elio*: M36I\par *Elio = Resistance Associated Mutations observed\par Summary of Mutations Observed:\par RT: P4T, E6K, V8I, V35I, A62V, A98S, K103R, K122E, I135T,\par  C162S, V179E, Y181Y/C, Y188L, T200A, I202V, Q207E,\par  H208Y, T215F, V245K, A272P, R277K, T286A, F346V, N348I,\par  M357T, K358R, A376V, K390R, E396D, A400T\par IN: T112I, V113I, T124A, D167E, V234L\par [place]: K14R, I15V, L19I, M36I, I62V, L63A, E65D, I72V\par \par #Hx PML c/b weakness and atrophy\par \par #pap - Needs\par #s/p J&J vaccine 4/28 \par \par

## 2021-12-02 NOTE — REASON FOR VISIT
[Home] : at home, [unfilled] , at the time of the visit. [Medical Office: ([facility])___] : at the medical office located in  [Verbal consent obtained from patient] : the patient, [unfilled] [Follow-Up: _____] : a [unfilled] follow-up visit [FreeTextEntry1] : HIV, med compliance

## 2021-12-02 NOTE — ASSESSMENT
[FreeTextEntry1] : \par #AIDS, hx PML\par - recent non-adherence with VL up to 80k in 3/2021 -> HIV VL [id number]\par - CD4 153/10% 11/2/2021 \par - continu biktarvy\par - check VL \par -start Bactrim 1DS daily \par \par Genosure Archive 4/6/21\par Abacavir Ziagen Sensitive\par  Elio*: T215F\par Didanosine Videx Sensitive\par  Elio*: T215F\par Emtricitabine Emtriva Sensitive\par  Elio*: A62V, T215F\par Lamivudine Epivir Sensitive\par  Elio*: A62V, T215F\par Stavudine Zerit Sensitive\par  Elio*: A62V, T215F\par Tenofovir Viread Sensitive 1\par  Elio*: A62V, T215F\par Zidovudine Retrovir Resistant 1\par  Elio*: A62V, T215F, N348I\par NNRTI\par Doravirine Pifeltro Resistant\par  Elio*: K103R, I135T, Y181Y/C, Y188L, Q207E, N348I\par Efavirenz Sustiva Resistant\par  Elio*: K103R, V179E, Y181Y/C, Y188L, N348I\par Etravirine Intelence Resistant\par  Elio*: V179E, Y181Y/C, Y188L, N348I\par Nevirapine Viramune Resistant\par  Elio*: K103R, V179E, Y181Y/C, Y188L, N348I\par Rilpivirine Edurant Resistant\par  Elio*: K103R, Y181Y/C, Y188L\par INI\par Bictegravir Bictegravir Sensitive\par  Elio*: None\par Dolutegravir Tivicay Sensitive\par  Elio*: None\par Elvitegravir Vitekta Sensitive\par  Elio*: None\par Raltegravir Isentress Sensitive\par  Elio*: None\par PI\par Atazanavir/r Reyataz / r Sensitive\par  Elio*: M36I, I62V\par Darunavir/r Prezista / r Sensitive\par  Elio*: None\par Fosamprenavir/r Lexiva / r Sensitive\par  Elio*: None\par Indinavir/r Crixivan / r Sensitive\par  Elio*: M36I\par Lopinavir Kaletra Sensitive\par  Elio*: None\par Nelfinavir Viracept Sensitive\par  Elio*: M36I\par Ritonavir Norvir Sensitive\par  Elio*: None\par Saquinavir/r Invirase / r Sensitive\par  Elio*: I62V\par Tipranavir/r Aptivus / r Sensitive\par  Elio*: M36I\par *Elio = Resistance Associated Mutations observed\par Summary of Mutations Observed:\par RT: P4T, E6K, V8I, V35I, A62V, A98S, K103R, K122E, I135T,\par  C162S, V179E, Y181Y/C, Y188L, T200A, I202V, Q207E,\par  H208Y, T215F, V245K, A272P, R277K, T286A, F346V, N348I,\par  M357T, K358R, A376V, K390R, E396D, A400T\par IN: T112I, V113I, T124A, D167E, V234L\par AL: K14R, I15V, L19I, M36I, I62V, L63A, E65D, I72V\par \par #Leukopenai - repeat CBC \par \par #Hx PML c/b weakness and atrophy\par \par #pap - Needs\par #s/p J&J vaccine 4/28 \par \par

## 2021-12-02 NOTE — HISTORY OF PRESENT ILLNESS
[FreeTextEntry1] : Televisit for HIV follow-up\par \par Received COVID booster 11/5/2021 with J and J \par \par Started on Biktarvy last month after having nausea/vomiting with prezista/genvoya.\par She reports mild nausea. Denies any vomiting. \par SHe is otherwise tolerating biktarvy well and denies missing any doses in the past month. \par Denies any fevers, chills, abdominal pain.

## 2021-12-02 NOTE — HISTORY OF PRESENT ILLNESS
[FreeTextEntry1] : Televisit for HIV follow-up\par \par Having nausea/vomiting with prezista/genvoya. \par REviewed VL which was elevated\par Denies any fevers, chills, abdominal pain.

## 2021-12-07 ENCOUNTER — RX RENEWAL (OUTPATIENT)
Age: 26
End: 2021-12-07

## 2022-01-24 ENCOUNTER — NON-APPOINTMENT (OUTPATIENT)
Age: 27
End: 2022-01-24

## 2022-02-22 ENCOUNTER — NON-APPOINTMENT (OUTPATIENT)
Age: 27
End: 2022-02-22

## 2022-02-24 ENCOUNTER — APPOINTMENT (OUTPATIENT)
Dept: INFECTIOUS DISEASE | Facility: CLINIC | Age: 27
End: 2022-02-24

## 2022-04-15 ENCOUNTER — NON-APPOINTMENT (OUTPATIENT)
Age: 27
End: 2022-04-15

## 2022-06-22 RX ORDER — SULFAMETHOXAZOLE AND TRIMETHOPRIM 800; 160 MG/1; MG/1
800-160 TABLET ORAL DAILY
Qty: 30 | Refills: 2 | Status: ACTIVE | COMMUNITY
Start: 2021-12-02 | End: 1900-01-01

## 2022-06-22 RX ORDER — NORGESTIMATE AND ETHINYL ESTRADIOL 7DAYSX3 LO
0.18/0.215/0.25 KIT ORAL
Qty: 1 | Refills: 5 | Status: ACTIVE | COMMUNITY
Start: 2018-05-03 | End: 1900-01-01

## 2022-08-17 ENCOUNTER — RX RENEWAL (OUTPATIENT)
Age: 27
End: 2022-08-17

## 2022-09-21 ENCOUNTER — RX RENEWAL (OUTPATIENT)
Age: 27
End: 2022-09-21

## 2022-09-21 RX ORDER — ERGOCALCIFEROL 1.25 MG/1
1.25 MG CAPSULE, LIQUID FILLED ORAL
Qty: 4 | Refills: 0 | Status: ACTIVE | COMMUNITY
Start: 2022-08-17 | End: 1900-01-01

## 2023-01-26 NOTE — H&P PST ADULT - MEDICATION HERBAL REMEDIES, PROFILE
Quality 110: Preventive Care And Screening: Influenza Immunization: Influenza Immunization Administered during Influenza season
Quality 131: Pain Assessment And Follow-Up: Pain assessment using a standardized tool is documented as negative, no follow-up plan required
Detail Level: Detailed
Quality 226: Preventive Care And Screening: Tobacco Use: Screening And Cessation Intervention: Patient screened for tobacco use and is an ex/non-smoker
no

## 2023-02-16 ENCOUNTER — RX RENEWAL (OUTPATIENT)
Age: 28
End: 2023-02-16

## 2023-10-12 NOTE — PATIENT PROFILE ADULT - TRANSPORTATION
Patient arrived the unit about 10:00 am,and made comfortable in room 837,She continues to complain of pain 8/10.Says Oxycodone does not help,provider updated.                         no

## 2023-10-19 NOTE — ED ADULT NURSE NOTE - NSIMPLEMENTINTERV_GEN_ALL_ED
Is This A New Presentation, Or A Follow-Up?: Skin Lesion How Severe Is Your Skin Lesion?: moderate Has Your Skin Lesion Been Treated?: not been treated Implemented All Universal Safety Interventions:  [place] to call system. Call bell, personal items and telephone within reach. Instruct patient to call for assistance. Room bathroom lighting operational. Non-slip footwear when patient is off stretcher. Physically safe environment: no spills, clutter or unnecessary equipment. Stretcher in lowest position, wheels locked, appropriate side rails in place.

## 2023-10-21 ENCOUNTER — RX RENEWAL (OUTPATIENT)
Age: 28
End: 2023-10-21

## 2024-03-25 ENCOUNTER — APPOINTMENT (OUTPATIENT)
Dept: NEUROLOGY | Facility: CLINIC | Age: 29
End: 2024-03-25
Payer: MEDICARE

## 2024-03-25 VITALS — WEIGHT: 140 LBS | BODY MASS INDEX: 27.48 KG/M2 | HEIGHT: 60 IN

## 2024-03-25 DIAGNOSIS — B20 HUMAN IMMUNODEFICIENCY VIRUS [HIV] DISEASE: ICD-10-CM

## 2024-03-25 DIAGNOSIS — A81.2 PROGRESSIVE MULTIFOCAL LEUKOENCEPHALOPATHY: ICD-10-CM

## 2024-03-25 PROCEDURE — 99204 OFFICE O/P NEW MOD 45 MIN: CPT

## 2024-03-25 RX ORDER — LEVETIRACETAM 500 MG/1
500 TABLET, FILM COATED ORAL
Qty: 120 | Refills: 6 | Status: ACTIVE | COMMUNITY
Start: 2024-03-25 | End: 1900-01-01

## 2024-03-25 NOTE — REVIEW OF SYSTEMS
[Leg Weakness] : leg weakness [Arm Weakness] : arm weakness [Inability to Walk] : inability to walk [Incontinence] : incontinence

## 2024-03-25 NOTE — HISTORY OF PRESENT ILLNESS
[FreeTextEntry1] : Ms. Dawkins is a 28-year-old female who presents today in neurologic consultation. She initially saw me in 2015 and 2017 for migraine headaches associated with nausea, vomiting, phonophobia, and photophobia. She no longer suffers from the headaches. 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 Subsequently, in 2019 the patient was hospitalized at [facility] with paralysis of the right arm and leg and became wheelchair bound. She did have history of HIV. 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 She had 2 EEGs which were normal. I saw her in 2020 and did not have at that time the records from the hospital which we do now. At that time the positive exam findings were right hemiplegia and left leg paresis. She was able to move the left upper extremity. The hemiplegia was spastic on the right and her reflexes were hyperactive. She also had weakness in the left leg. Currently, she is in a wheelchair and is unable to stand or walk. Patient is taking Keppra 1000 mg q12h and Biktarvy 25-35-50 mg qd and baclofen 10 mg TID for her HIV.

## 2024-03-25 NOTE — ASSESSMENT
[FreeTextEntry1] : Impression is that of progressive multifocal leukoencephalopathy unchanged since previous examination from 2020. I did renew her Keppra 500 mg 2 tabs BID. We will see her on an as needed basis. I did give her physical therapy for the arms and legs.    Total clinician time spent today on the patient is 45 minutes including preparing to see the patient, obtaining and/or reviewing and confirming history, performing medically necessary and appropriate examination, counseling and educating the patient and/or family, documenting clinical information in the EHR and communicating and/or referring to other healthcare professionals.   Entered by Kelsy Jackson acting as scribe for Dr. Sharpe.   The documentation recorded by the scribe, in my presence, accurately reflects the service I personally performed, and the decisions made by me with my edits as appropriate. Jules Sharpe MD, FAAN, FACP Diplomate American Board of Psychiatry & Neurology.

## 2024-03-25 NOTE — PHYSICAL EXAM
[FreeTextEntry1] :  NEUROLOGICAL EXAMINATION:  Mental Status: Patient is alert, oriented, coherent, relevant, appropriate, and lucid.  Cranial Nerves Cranial Nerves:  II, III, IV, VI: Pupils are equal, round, and reactive to light and accommodation. No evidence of afferent pupillary defect. Visual fields are full to confrontation. Eye movements are full without evidence of nystagmus or internuclear ophthalmoplegia. Funduscopic examination reveals sharp disc margins.  V: Normal jaw movements. Normal facial sensation.  VII: Normal facial motor testing.  VIII: Grossly normal hearing bilaterally.  IX, X: Palate moves symmetrically. No dysarthria.  XI: Normal shoulder shrug and sternocleidomastoid power.  XII: Tongue appears normal and protrudes in the midline.  Motor: Spastic right hemiplegia. She can elevate the left arm but cannot move the left leg.  Muscle Stretch Reflexes (right/left): Babinski bilaterally. Hyperactive throughout.  Gait and Station: Not testable. Patient is wheelchair bound.

## 2024-04-17 NOTE — ED ADULT NURSE NOTE - NS ED NOTE ABUSE RESPONSE YN
Tdap (Tetanus, Diptheria, Pertussis)   -booster shot for pertussis (whooping cough)  -recommended by the CDC (Centers for Disease Control) for every pregnant woman in the third trimester (28 weeks and beyond)  -the purpose of giving the vaccine during pregnancy is so that the mother can share her antibodies with the fetus across the placenta  -it is recommended to take this vaccine early in the third trimester so that your body has enough time to produce the antibodies that can pass across the placenta to the fetus  -the infant cannot be vaccinated for pertussis until 2 months of age due to an immature immune system and lack of response to the vaccine prior to that time.   -the father of the baby as well as grandparents, other caregivers, etc should receive a booster shot for whooping cough as well (vaccination at least 1 time after the age of 18 is sufficient)     [facility] Prenatal Classes (and virtual tour of Labor & Delivery unit)  [email or web address]  [phone]    Pre-registration for the hospital  [email or web address]    Breast pump  -contact your insurance to request them to send an order to us for their preferred medical supply company  Or  -contact [employer] (flyer available on the lobby wall across from reception desk)   [email or web address]    Car seat *MUST* be installed before infant(s) can be leave the hospital    Doctor for baby   *Please select a pediatrician or family medicine provider BEFORE you are admitted to the hospital to give birth.   Pediatrics (birth-18 years of age) or Family Medicine (birth through adulthood)  Make sure that provider accepts your insurance.   Pick a provider that is close to where you live.  If the provider is on staff at [place], the nursing staff will notify them when your infant is born so they are aware to come to the hospital to examine the infant.   If the provider you have chosen is not on  staff at [facility], a pediatric hospitalist will care for your infant during the hospitalization only. You must arrange the follow up visit with your provider.   After delivery at the hospital follow up visit instructions for baby will be provided prior to discharge.     The baby will not be able to leave the hospital without a follow up visit scheduled.     To establish care:  [email or web address]  Or   sharyn [facility] Physician Referral line at [phone]    There are MANY providers available. It would be impossible to list them all, but here are a few popular pediatrics groups in [place]:    [facility] [place] [phone]  [facility] [place] [phone]  [facility] [place] [phone]  [facility] [place] [phone]  [facility] [place] [phone]  [facility] [phone]    There are also many wonderful independent practitioners as well. We recommend you speak with family, friends, colleagues as personal recommendations can be very helpful.       Yes

## 2024-04-27 ENCOUNTER — RX RENEWAL (OUTPATIENT)
Age: 29
End: 2024-04-27

## 2024-04-27 RX ORDER — BICTEGRAVIR SODIUM, EMTRICITABINE, AND TENOFOVIR ALAFENAMIDE FUMARATE 50; 200; 25 MG/1; MG/1; MG/1
50-200-25 TABLET ORAL
Qty: 30 | Refills: 5 | Status: ACTIVE | COMMUNITY
Start: 2021-11-11 | End: 1900-01-01